# Patient Record
Sex: MALE | Race: WHITE | Employment: OTHER | URBAN - METROPOLITAN AREA
[De-identification: names, ages, dates, MRNs, and addresses within clinical notes are randomized per-mention and may not be internally consistent; named-entity substitution may affect disease eponyms.]

---

## 2017-04-24 ENCOUNTER — HOSPITAL ENCOUNTER (OUTPATIENT)
Dept: INTERVENTIONAL RADIOLOGY/VASCULAR | Age: 82
Discharge: HOME | End: 2017-04-24
Attending: ORTHOPAEDIC SURGERY | Admitting: ORTHOPAEDIC SURGERY
Payer: MEDICARE

## 2017-04-24 VITALS
HEART RATE: 73 BPM | BODY MASS INDEX: 29.62 KG/M2 | SYSTOLIC BLOOD PRESSURE: 155 MMHG | RESPIRATION RATE: 20 BRPM | HEIGHT: 69 IN | OXYGEN SATURATION: 98 % | DIASTOLIC BLOOD PRESSURE: 58 MMHG | WEIGHT: 200 LBS | TEMPERATURE: 98.1 F

## 2017-04-24 DIAGNOSIS — M48.061 LUMBAR STENOSIS: ICD-10-CM

## 2017-04-24 DIAGNOSIS — M51.36 DDD (DEGENERATIVE DISC DISEASE), LUMBAR: ICD-10-CM

## 2017-04-24 PROCEDURE — 64483 NJX AA&/STRD TFRM EPI L/S 1: CPT

## 2017-04-24 PROCEDURE — 62323 NJX INTERLAMINAR LMBR/SAC: CPT

## 2017-04-24 PROCEDURE — 74011000250 HC RX REV CODE- 250: Performed by: RADIOLOGY

## 2017-04-24 PROCEDURE — 74011636320 HC RX REV CODE- 636/320: Performed by: RADIOLOGY

## 2017-04-24 PROCEDURE — 74011250636 HC RX REV CODE- 250/636: Performed by: RADIOLOGY

## 2017-04-24 PROCEDURE — 77030003666 HC NDL SPINAL BD -A

## 2017-04-24 RX ORDER — LIDOCAINE HYDROCHLORIDE 10 MG/ML
15 INJECTION, SOLUTION EPIDURAL; INFILTRATION; INTRACAUDAL; PERINEURAL
Status: COMPLETED | OUTPATIENT
Start: 2017-04-24 | End: 2017-04-24

## 2017-04-24 RX ORDER — CHOLECALCIFEROL (VITAMIN D3) 125 MCG
2 CAPSULE ORAL 2 TIMES DAILY
COMMUNITY
End: 2017-07-27

## 2017-04-24 RX ORDER — METHYLPREDNISOLONE ACETATE 40 MG/ML
120 INJECTION, SUSPENSION INTRA-ARTICULAR; INTRALESIONAL; INTRAMUSCULAR; SOFT TISSUE
Status: COMPLETED | OUTPATIENT
Start: 2017-04-24 | End: 2017-04-24

## 2017-04-24 RX ADMIN — METHYLPREDNISOLONE ACETATE 120 MG: 40 INJECTION, SUSPENSION INTRA-ARTICULAR; INTRALESIONAL; INTRAMUSCULAR; SOFT TISSUE at 10:28

## 2017-04-24 RX ADMIN — LIDOCAINE HYDROCHLORIDE 15 ML: 10 INJECTION, SOLUTION EPIDURAL; INFILTRATION; INTRACAUDAL; PERINEURAL at 10:27

## 2017-04-24 RX ADMIN — IOHEXOL 20 ML: 180 INJECTION INTRAVENOUS at 10:27

## 2017-04-24 NOTE — IP AVS SNAPSHOT
Current Discharge Medication List  
  
ASK your doctor about these medications Dose & Instructions Dispensing Information Comments Morning Noon Evening Bedtime ALEVE 220 mg Cap Generic drug:  naproxen sodium Your last dose was: Your next dose is:    
   
   
 Dose:  2 Tab Take 2 Tabs by mouth two (2) times a day. Refills:  0  
     
   
   
   
  
 aspirin delayed-release 81 mg tablet Your last dose was: Your next dose is:    
   
   
 Dose:  81 mg Take 81 mg by mouth daily. Refills:  0  
     
   
   
   
  
 coenzyme Q-10 200 mg capsule Commonly known as:  CO Q-10 Your last dose was: Your next dose is:    
   
   
 Dose:  200 mg Take 200 mg by mouth daily. Refills:  0  
     
   
   
   
  
 glipiZIDE 5 mg tablet Commonly known as:  Luana López Your last dose was: Your next dose is:    
   
   
 Dose:  5 mg Take 5 mg by mouth every morning. Refills:  0  
     
   
   
   
  
 lisinopril 20 mg tablet Commonly known as:  Mark Busch Your last dose was: Your next dose is:    
   
   
 Dose:  20 mg Take 20 mg by mouth daily. Refills:  0  
     
   
   
   
  
 magnesium 250 mg Tab Your last dose was: Your next dose is:    
   
   
 Dose:  250 mg Take 250 mg by mouth daily. Refills:  0  
     
   
   
   
  
 multivitamin tablet Commonly known as:  ONE A DAY Your last dose was: Your next dose is:    
   
   
 Dose:  1 Tab Take 1 Tab by mouth daily. Refills:  0  
     
   
   
   
  
 oxyCODONE IR 5 mg immediate release tablet Commonly known as:  Clista Mate Your last dose was: Your next dose is:    
   
   
 Dose:  5 mg Take 1 Tab by mouth every four (4) hours as needed for Pain. Max Daily Amount: 30 mg.  
 Quantity:  40 Tab Refills:  0  
     
   
   
   
  
 red yeast rice extract 600 mg Cap Your last dose was: Your next dose is:    
   
   
 Dose:  1200 mg Take 1,200 mg by mouth daily. Refills:  0

## 2017-04-24 NOTE — ROUTINE PROCESS
Pt. Discharged to home. Able to stand and step without difficulty. Pt. Verbalized understanding of d/c instructions.

## 2017-04-24 NOTE — IP AVS SNAPSHOT
[address] 
[phone] Patient: Deshawn June MRN: [id number] TBM:[birth year]15 You are allergic to the following Allergen Reactions Prednisone Drowsiness Recent Documentation Height Weight BMI Smoking Status 1.753 m 90.7 kg 29.53 kg/m2 Former Smoker Emergency Contacts Name Discharge Info Relation Home Work Mobile Karena Mckenzie  Daughter [21] [phone] About your hospitalization You were admitted on:  April 24, 2017 You last received care in the:  [facility] You were discharged on:  April 24, 2017 Unit phone number:  [phone] Why you were hospitalized Your primary diagnosis was:  Not on File Providers Seen During Your Hospitalizations Provider Role Specialty Primary office phone Jose Maria Vargas MD Attending Provider Orthopedic Surgery [phone] Your Primary Care Physician (PCP) Primary Care Physician Office Phone Office Fax Araseli Medrano [address] Follow-up Information None Your Appointments Monday April 24, 2017 10:30 AM EDT  
IR INJ FORA EP LUM ANE/EDVIN with Fabiola Goff MD, [facility]  
[facility] ([address]) [address]  
[phone] GENERAL INSTRUCTIONS Patient should report to the Admitting Office just inside the main entrance to the Hospital 30 minutes prior to the appointment time unless instructed otherwise. (NOT FOR MRI). Current Discharge Medication List  
  
ASK your doctor about these medications Dose & Instructions Dispensing Information Comments Morning Noon Evening Bedtime ALEVE 220 mg Cap Generic drug:  naproxen sodium Your last dose was: Your next dose is:    
   
   
 Dose:  2 Tab Take 2 Tabs by mouth two (2) times a day. Refills:  0  
     
   
   
   
  
 aspirin delayed-release 81 mg tablet Your last dose was: Your next dose is:    
   
   
 Dose:  81 mg Take 81 mg by mouth daily. Refills:  0  
     
   
   
   
  
 coenzyme Q-10 200 mg capsule Commonly known as:  CO Q-10 Your last dose was: Your next dose is:    
   
   
 Dose:  200 mg Take 200 mg by mouth daily. Refills:  0  
     
   
   
   
  
 glipiZIDE 5 mg tablet Commonly known as:  Annetta Rucker Your last dose was: Your next dose is:    
   
   
 Dose:  5 mg Take 5 mg by mouth every morning. Refills:  0  
     
   
   
   
  
 lisinopril 20 mg tablet Commonly known as:  Nancideshaun López Your last dose was: Your next dose is:    
   
   
 Dose:  20 mg Take 20 mg by mouth daily. Refills:  0  
     
   
   
   
  
 magnesium 250 mg Tab Your last dose was: Your next dose is:    
   
   
 Dose:  250 mg Take 250 mg by mouth daily. Refills:  0  
     
   
   
   
  
 multivitamin tablet Commonly known as:  ONE A DAY Your last dose was: Your next dose is:    
   
   
 Dose:  1 Tab Take 1 Tab by mouth daily. Refills:  0  
     
   
   
   
  
 oxyCODONE IR 5 mg immediate release tablet Commonly known as:  Lex Vallejo Your last dose was: Your next dose is:    
   
   
 Dose:  5 mg Take 1 Tab by mouth every four (4) hours as needed for Pain. Max Daily Amount: 30 mg.  
 Quantity:  40 Tab Refills:  0  
     
   
   
   
  
 red yeast rice extract 600 mg Cap Your last dose was: Your next dose is:    
   
   
 Dose:  1200 mg Take 1,200 mg by mouth daily. Refills:  0 Discharge Instructions Steroid Injection Discharge Instructions General Information:  A steroid injection was performed today, placing a combination of a steroid and an anesthetic (numbing medicine) into the space around the nerves of your spine. This is done to treat back pain. It may take 7-10 days for the injection to reach its full potential.  This procedure can be done at any level of the spinal column, depending on where your pain is. Your doctor will have ordered the appropriate level to be treated prior to your coming in for the procedure. Home Care Instructions: You can resume your regular diet. Do not drink alcohol today. You may notice that you have to use your pain medications less after your injection. Some people do not notice much of a change in their pain after the first injection. If that is the case, it is worth your time to have a second one done. This is why these injections are sometimes ordered in a series of three. Keep the puncture site clean and dry for 24 hours, and then you may remove the dressing. Showering is acceptable after the bandage is removed. Rest today be aware there maybe numbness or tingling that may last up to 12 hours after the inject. Call If: 
 You should call your Physician and/or the Radiology Nurse if you have any bleeding other than a small spot on your bandage. Call if you have any signs of infection, fever, increased pain at the puncture site, confusion, or a headache that worsens when you stand and eases when lying flat. Follow-Up Instructions:  Please see your ordering doctor as he/she has requested. Let your doctor know if you have relief from your pain so they may schedule another injection for you if it is indicated. To Reach Us:  Should you experience any significant changes, please call [phone] between the hours of 7:30 am and 10 pm or [phone] after hours. After hours, ask the  to page the [facility] Technologist, and describe the problem to the technologist.  
 
 
 
 
Date: 4/24/2017 Discharging Nurse:  Venita Rodriguez RN Discharge Orders None Introducing [facility] HEALTH SERVICES! [facility] introduces [employer] patient portal. Now you can access parts of your medical record, email your doctor's office, and request medication refills online. 1. In your internet browser, go to [email or web address] 2. Click on the First Time User? Click Here link in the Sign In box. You will see the New Member Sign Up page. 3. Enter your [employer] Access Code exactly as it appears below. You will not need to use this code after youve completed the sign-up process. If you do not sign up before the expiration date, you must request a new code. · [employer] Access Code: [email or web address] Expires: 6/26/2017 11:42 AM 
 
4. Enter the last four digits of your Social Security Number (xxxx) and Date of Birth (mm/dd/yyyy) as indicated and click Submit. You will be taken to the next sign-up page. 5. Create a [employer] ID. This will be your [employer] login ID and cannot be changed, so think of one that is secure and easy to remember. 6. Create a [employer] password. You can change your password at any time. 7. Enter your Password Reset Question and Answer. This can be used at a later time if you forget your password. 8. Enter your e-mail address. You will receive e-mail notification when new information is available in [address] 9. Click Sign Up. You can now view and download portions of your medical record. 10. Click the Download Summary menu link to download a portable copy of your medical information. If you have questions, please visit the Frequently Asked Questions section of the [employer] website. Remember, [employer] is NOT to be used for urgent needs. For medical emergencies, dial 911. Now available from your iPhone and Android! General Information Please provide this summary of care documentation to your next provider. Patient Signature:  ____________________________________________________________ Date:  ____________________________________________________________  
  
Albin Darlin Provider Signature:  ____________________________________________________________ Date:  ____________________________________________________________

## 2017-04-24 NOTE — PROCEDURES
PROCEDURE:EDWIN. INDICATION:spinal stenosis. ANESTHESIA:local.  COMPLICATION:NONE. SPECIMENS REMOVED:none. BLOOD LOSS:NONE. /ASSISTANT:KALA Obrien RECOMMENDATIONS:none. CONSENT OBTAINED:YES.  NOTES:none.

## 2017-04-24 NOTE — DISCHARGE INSTRUCTIONS
Steroid Injection Discharge Instructions    General Information:   A steroid injection was performed today, placing a combination of a steroid and an anesthetic (numbing medicine) into the space around the nerves of your spine. This is done to treat back pain. It may take 7-10 days for the injection to reach its full potential.  This procedure can be done at any level of the spinal column, depending on where your pain is. Your doctor will have ordered the appropriate level to be treated prior to your coming in for the procedure. Home Care Instructions: You can resume your regular diet. Do not drink alcohol today. You may notice that you have to use your pain medications less after your injection. Some people do not notice much of a change in their pain after the first injection. If that is the case, it is worth your time to have a second one done. This is why these injections are sometimes ordered in a series of three. Keep the puncture site clean and dry for 24 hours, and then you may remove the dressing. Showering is acceptable after the bandage is removed. Rest today be aware there maybe numbness or tingling that may last up to 12 hours after the inject. Call If:   You should call your Physician and/or the Radiology Nurse if you have any bleeding other than a small spot on your bandage. Call if you have any signs of infection, fever, increased pain at the puncture site, confusion, or a headache that worsens when you stand and eases when lying flat. Follow-Up Instructions:  Please see your ordering doctor as he/she has requested. Let your doctor know if you have relief from your pain so they may schedule another injection for you if it is indicated. To Reach Us:  Should you experience any significant changes, please call [phone] between the hours of 7:30 am and 10 pm or [phone] after hours.  After hours, ask the  to page the X-ray Technologist, and describe the problem to the technologist.           Date: 4/24/2017  Discharging Nurse:  Venita Rodriguez RN

## 2017-06-28 ENCOUNTER — HOSPITAL ENCOUNTER (OUTPATIENT)
Dept: INTERVENTIONAL RADIOLOGY/VASCULAR | Age: 82
Discharge: HOME | End: 2017-06-28
Attending: ORTHOPAEDIC SURGERY | Admitting: ORTHOPAEDIC SURGERY
Payer: MEDICARE

## 2017-06-28 VITALS
TEMPERATURE: 98.3 F | DIASTOLIC BLOOD PRESSURE: 56 MMHG | RESPIRATION RATE: 20 BRPM | SYSTOLIC BLOOD PRESSURE: 124 MMHG | OXYGEN SATURATION: 98 % | HEART RATE: 80 BPM | WEIGHT: 190 LBS | BODY MASS INDEX: 27.2 KG/M2 | HEIGHT: 70 IN

## 2017-06-28 DIAGNOSIS — M48.061 LUMBAR SPINAL STENOSIS: ICD-10-CM

## 2017-06-28 PROCEDURE — 64483 NJX AA&/STRD TFRM EPI L/S 1: CPT

## 2017-06-28 PROCEDURE — 74011636320 HC RX REV CODE- 636/320: Performed by: RADIOLOGY

## 2017-06-28 PROCEDURE — 77030003666 HC NDL SPINAL BD -A

## 2017-06-28 PROCEDURE — 74011000250 HC RX REV CODE- 250: Performed by: RADIOLOGY

## 2017-06-28 PROCEDURE — 74011250636 HC RX REV CODE- 250/636: Performed by: RADIOLOGY

## 2017-06-28 RX ORDER — LIDOCAINE HYDROCHLORIDE 10 MG/ML
15 INJECTION, SOLUTION EPIDURAL; INFILTRATION; INTRACAUDAL; PERINEURAL
Status: COMPLETED | OUTPATIENT
Start: 2017-06-28 | End: 2017-06-28

## 2017-06-28 RX ORDER — METHYLPREDNISOLONE ACETATE 40 MG/ML
120 INJECTION, SUSPENSION INTRA-ARTICULAR; INTRALESIONAL; INTRAMUSCULAR; SOFT TISSUE
Status: COMPLETED | OUTPATIENT
Start: 2017-06-28 | End: 2017-06-28

## 2017-06-28 RX ADMIN — IOHEXOL 5 ML: 180 INJECTION INTRAVENOUS at 11:25

## 2017-06-28 RX ADMIN — LIDOCAINE HYDROCHLORIDE 15 ML: 10 INJECTION, SOLUTION EPIDURAL; INFILTRATION; INTRACAUDAL; PERINEURAL at 11:24

## 2017-06-28 RX ADMIN — METHYLPREDNISOLONE ACETATE 120 MG: 40 INJECTION, SUSPENSION INTRA-ARTICULAR; INTRALESIONAL; INTRAMUSCULAR; SOFT TISSUE at 11:25

## 2017-06-28 NOTE — DISCHARGE INSTRUCTIONS
Steroid Injection Discharge Instructions    General Information:   A steroid injection was performed today, placing a combination of a steroid and an anesthetic (numbing medicine) into the space around the nerves of your spine. This is done to treat back pain. It may take 7-10 days for the injection to reach its full potential.  This procedure can be done at any level of the spinal column, depending on where your pain is. Your doctor will have ordered the appropriate level to be treated prior to your coming in for the procedure. Home Care Instructions: You can resume your regular diet. Do not drink alcohol. You may notice that you have to use your pain medications less after your injection. Some people do not notice much of a change in their pain after the first injection. If that is the case, it is worth your time to have a second one done. This is why these injections are sometimes ordered in a series of three. Keep the puncture site clean and dry for 24 hours, and then you may remove the dressing. Showering is acceptable after the bandage is removed. Call If:   You should call your Physician and/or the Radiology Nurse if you have any bleeding other than a small spot on your bandage. Call if you have any signs of infection, fever, increased pain at the puncture site, confusion, or a headache that worsens when you stand and eases when lying flat. Follow-Up Instructions:  Please see your ordering doctor as he/she has requested. Let your doctor know if you have relief from your pain so they may schedule another injection for you if it is indicated. To Reach Us:  Side effects of sedation medications and other medications used today have been reviewed. Notify us of nausea, itching, hives, dizziness, or anything else out of the ordinary. Should you experience any of these significant changes, please call [phone] between the hours of 7:30 am and 10 pm or [phone] after hours.  After hours, ask the  to page the [address] Technologist, and describe the problem to the technologist.         Patient Signature:  Date: 6/28/2017  Discharging Nurse: Toro Martinez RN

## 2017-06-28 NOTE — ROUTINE PROCESS
Pt discharged to home with belongings and instructions via wheelchair with daughter. Pt at baseline walking with cane. Back site dry and intact. Pt verbalized understanding of instructions.

## 2017-06-29 NOTE — PROCEDURES
PROCEDURE:TFESI. INDICATION:LBP. ANESTHESIA:local  COMPLICATION:NONE. SPECIMENS REMOVED:none. BLOOD LOSS:NONE. /ASSISTANT:KALA Obrien RECOMMENDATIONS:none. CONSENT OBTAINED:YES.  NOTES:none.

## 2017-07-27 ENCOUNTER — APPOINTMENT (OUTPATIENT)
Dept: MRI IMAGING | Age: 82
DRG: 460 | End: 2017-07-27
Attending: NURSE PRACTITIONER
Payer: MEDICARE

## 2017-07-27 ENCOUNTER — HOSPITAL ENCOUNTER (INPATIENT)
Age: 82
LOS: 8 days | Discharge: NURSING, REHAB OR LONG-TERM CARE | DRG: 460 | End: 2017-08-04
Attending: EMERGENCY MEDICINE | Admitting: ORTHOPAEDIC SURGERY
Payer: MEDICARE

## 2017-07-27 DIAGNOSIS — M48.00 SPINAL STENOSIS, UNSPECIFIED SPINAL REGION: ICD-10-CM

## 2017-07-27 DIAGNOSIS — R52 INTRACTABLE PAIN: Primary | ICD-10-CM

## 2017-07-27 LAB
ALBUMIN SERPL BCP-MCNC: 3.7 G/DL (ref 3.5–5)
ALBUMIN/GLOB SERPL: 1.2 {RATIO} (ref 1.1–2.2)
ALP SERPL-CCNC: 65 U/L (ref 45–117)
ALT SERPL-CCNC: 24 U/L (ref 12–78)
ANION GAP BLD CALC-SCNC: 6 MMOL/L (ref 5–15)
AST SERPL W P-5'-P-CCNC: 19 U/L (ref 15–37)
BASOPHILS # BLD AUTO: 0 K/UL (ref 0–0.1)
BASOPHILS # BLD: 0 % (ref 0–1)
BILIRUB SERPL-MCNC: 0.4 MG/DL (ref 0.2–1)
BUN SERPL-MCNC: 35 MG/DL (ref 6–20)
BUN/CREAT SERPL: 26 (ref 12–20)
CALCIUM SERPL-MCNC: 8.5 MG/DL (ref 8.5–10.1)
CHLORIDE SERPL-SCNC: 106 MMOL/L (ref 97–108)
CO2 SERPL-SCNC: 28 MMOL/L (ref 21–32)
CREAT SERPL-MCNC: 1.37 MG/DL (ref 0.7–1.3)
EOSINOPHIL # BLD: 0.1 K/UL (ref 0–0.4)
EOSINOPHIL NFR BLD: 2 % (ref 0–7)
ERYTHROCYTE [DISTWIDTH] IN BLOOD BY AUTOMATED COUNT: 12.4 % (ref 11.5–14.5)
GLOBULIN SER CALC-MCNC: 3.1 G/DL (ref 2–4)
GLUCOSE BLD STRIP.AUTO-MCNC: 162 MG/DL (ref 65–100)
GLUCOSE SERPL-MCNC: 97 MG/DL (ref 65–100)
HCT VFR BLD AUTO: 35.5 % (ref 36.6–50.3)
HGB BLD-MCNC: 11.7 G/DL (ref 12.1–17)
LYMPHOCYTES # BLD AUTO: 24 % (ref 12–49)
LYMPHOCYTES # BLD: 1.7 K/UL (ref 0.8–3.5)
MCH RBC QN AUTO: 31.3 PG (ref 26–34)
MCHC RBC AUTO-ENTMCNC: 33 G/DL (ref 30–36.5)
MCV RBC AUTO: 94.9 FL (ref 80–99)
MONOCYTES # BLD: 0.4 K/UL (ref 0–1)
MONOCYTES NFR BLD AUTO: 6 % (ref 5–13)
NEUTS SEG # BLD: 4.8 K/UL (ref 1.8–8)
NEUTS SEG NFR BLD AUTO: 68 % (ref 32–75)
PLATELET # BLD AUTO: 199 K/UL (ref 150–400)
POTASSIUM SERPL-SCNC: 4.8 MMOL/L (ref 3.5–5.1)
PROT SERPL-MCNC: 6.8 G/DL (ref 6.4–8.2)
RBC # BLD AUTO: 3.74 M/UL (ref 4.1–5.7)
SERVICE CMNT-IMP: ABNORMAL
SODIUM SERPL-SCNC: 140 MMOL/L (ref 136–145)
WBC # BLD AUTO: 7 K/UL (ref 4.1–11.1)

## 2017-07-27 PROCEDURE — 83036 HEMOGLOBIN GLYCOSYLATED A1C: CPT | Performed by: NURSE PRACTITIONER

## 2017-07-27 PROCEDURE — 96376 TX/PRO/DX INJ SAME DRUG ADON: CPT

## 2017-07-27 PROCEDURE — 82962 GLUCOSE BLOOD TEST: CPT

## 2017-07-27 PROCEDURE — 72148 MRI LUMBAR SPINE W/O DYE: CPT

## 2017-07-27 PROCEDURE — 80053 COMPREHEN METABOLIC PANEL: CPT | Performed by: NURSE PRACTITIONER

## 2017-07-27 PROCEDURE — 96375 TX/PRO/DX INJ NEW DRUG ADDON: CPT

## 2017-07-27 PROCEDURE — 72146 MRI CHEST SPINE W/O DYE: CPT

## 2017-07-27 PROCEDURE — 74011250637 HC RX REV CODE- 250/637: Performed by: PHYSICIAN ASSISTANT

## 2017-07-27 PROCEDURE — 74011250637 HC RX REV CODE- 250/637: Performed by: FAMILY MEDICINE

## 2017-07-27 PROCEDURE — 65270000029 HC RM PRIVATE

## 2017-07-27 PROCEDURE — 99285 EMERGENCY DEPT VISIT HI MDM: CPT

## 2017-07-27 PROCEDURE — 36415 COLL VENOUS BLD VENIPUNCTURE: CPT | Performed by: NURSE PRACTITIONER

## 2017-07-27 PROCEDURE — 74011250636 HC RX REV CODE- 250/636: Performed by: NURSE PRACTITIONER

## 2017-07-27 PROCEDURE — 74011250636 HC RX REV CODE- 250/636: Performed by: PHYSICIAN ASSISTANT

## 2017-07-27 PROCEDURE — 85025 COMPLETE CBC W/AUTO DIFF WBC: CPT | Performed by: NURSE PRACTITIONER

## 2017-07-27 PROCEDURE — 96374 THER/PROPH/DIAG INJ IV PUSH: CPT

## 2017-07-27 PROCEDURE — 72141 MRI NECK SPINE W/O DYE: CPT

## 2017-07-27 RX ORDER — ASPIRIN 81 MG/1
81 TABLET ORAL DAILY
Status: DISCONTINUED | OUTPATIENT
Start: 2017-07-28 | End: 2017-07-28

## 2017-07-27 RX ORDER — LISINOPRIL 20 MG/1
20 TABLET ORAL DAILY
Status: DISCONTINUED | OUTPATIENT
Start: 2017-07-28 | End: 2017-07-29

## 2017-07-27 RX ORDER — INSULIN LISPRO 100 [IU]/ML
INJECTION, SOLUTION INTRAVENOUS; SUBCUTANEOUS
Status: DISCONTINUED | OUTPATIENT
Start: 2017-07-27 | End: 2017-08-04 | Stop reason: HOSPADM

## 2017-07-27 RX ORDER — DIPHENHYDRAMINE HYDROCHLORIDE 50 MG/ML
12.5 INJECTION, SOLUTION INTRAMUSCULAR; INTRAVENOUS
Status: DISCONTINUED | OUTPATIENT
Start: 2017-07-27 | End: 2017-08-01

## 2017-07-27 RX ORDER — DEXAMETHASONE SODIUM PHOSPHATE 4 MG/ML
10 INJECTION, SOLUTION INTRA-ARTICULAR; INTRALESIONAL; INTRAMUSCULAR; INTRAVENOUS; SOFT TISSUE ONCE
Status: COMPLETED | OUTPATIENT
Start: 2017-07-27 | End: 2017-07-27

## 2017-07-27 RX ORDER — ONDANSETRON 2 MG/ML
4 INJECTION INTRAMUSCULAR; INTRAVENOUS
Status: COMPLETED | OUTPATIENT
Start: 2017-07-27 | End: 2017-07-27

## 2017-07-27 RX ORDER — HYDROCODONE BITARTRATE AND ACETAMINOPHEN 5; 325 MG/1; MG/1
1 TABLET ORAL
COMMUNITY
Start: 2017-07-26 | End: 2017-08-04

## 2017-07-27 RX ORDER — OXYCODONE AND ACETAMINOPHEN 5; 325 MG/1; MG/1
1 TABLET ORAL
Status: DISCONTINUED | OUTPATIENT
Start: 2017-07-27 | End: 2017-08-01

## 2017-07-27 RX ORDER — LISINOPRIL 20 MG/1
20 TABLET ORAL DAILY
COMMUNITY

## 2017-07-27 RX ORDER — GLIPIZIDE 5 MG/1
5 TABLET, FILM COATED, EXTENDED RELEASE ORAL DAILY
COMMUNITY

## 2017-07-27 RX ORDER — SODIUM CHLORIDE 0.9 % (FLUSH) 0.9 %
5-10 SYRINGE (ML) INJECTION EVERY 8 HOURS
Status: DISCONTINUED | OUTPATIENT
Start: 2017-07-27 | End: 2017-08-04 | Stop reason: HOSPADM

## 2017-07-27 RX ORDER — FENTANYL CITRATE 50 UG/ML
50 INJECTION, SOLUTION INTRAMUSCULAR; INTRAVENOUS
Status: COMPLETED | OUTPATIENT
Start: 2017-07-27 | End: 2017-07-27

## 2017-07-27 RX ORDER — DEXTROSE 50 % IN WATER (D50W) INTRAVENOUS SYRINGE
12.5-25 AS NEEDED
Status: DISCONTINUED | OUTPATIENT
Start: 2017-07-27 | End: 2017-07-27 | Stop reason: CLARIF

## 2017-07-27 RX ORDER — HYDROMORPHONE HYDROCHLORIDE 2 MG/ML
1 INJECTION, SOLUTION INTRAMUSCULAR; INTRAVENOUS; SUBCUTANEOUS ONCE
Status: COMPLETED | OUTPATIENT
Start: 2017-07-27 | End: 2017-07-27

## 2017-07-27 RX ORDER — ACETAMINOPHEN 325 MG/1
650 TABLET ORAL
Status: DISCONTINUED | OUTPATIENT
Start: 2017-07-27 | End: 2017-08-01

## 2017-07-27 RX ORDER — FACIAL-BODY WIPES
10 EACH TOPICAL DAILY PRN
Status: DISCONTINUED | OUTPATIENT
Start: 2017-07-27 | End: 2017-08-01

## 2017-07-27 RX ORDER — GLIPIZIDE 5 MG/1
5 TABLET, FILM COATED, EXTENDED RELEASE ORAL
Status: DISCONTINUED | OUTPATIENT
Start: 2017-07-28 | End: 2017-07-29

## 2017-07-27 RX ORDER — SODIUM CHLORIDE 0.9 % (FLUSH) 0.9 %
5-10 SYRINGE (ML) INJECTION AS NEEDED
Status: DISCONTINUED | OUTPATIENT
Start: 2017-07-27 | End: 2017-08-04 | Stop reason: HOSPADM

## 2017-07-27 RX ORDER — MAGNESIUM SULFATE 100 %
4 CRYSTALS MISCELLANEOUS AS NEEDED
Status: DISCONTINUED | OUTPATIENT
Start: 2017-07-27 | End: 2017-08-04 | Stop reason: HOSPADM

## 2017-07-27 RX ORDER — THERA TABS 400 MCG
1 TAB ORAL DAILY
Status: DISCONTINUED | OUTPATIENT
Start: 2017-07-28 | End: 2017-08-04 | Stop reason: HOSPADM

## 2017-07-27 RX ORDER — ONDANSETRON 2 MG/ML
4 INJECTION INTRAMUSCULAR; INTRAVENOUS
Status: DISCONTINUED | OUTPATIENT
Start: 2017-07-27 | End: 2017-08-01

## 2017-07-27 RX ORDER — METOPROLOL TARTRATE 25 MG/1
12.5 TABLET, FILM COATED ORAL EVERY 12 HOURS
Status: DISCONTINUED | OUTPATIENT
Start: 2017-07-27 | End: 2017-08-04 | Stop reason: HOSPADM

## 2017-07-27 RX ORDER — MORPHINE SULFATE 2 MG/ML
4 INJECTION, SOLUTION INTRAMUSCULAR; INTRAVENOUS ONCE
Status: COMPLETED | OUTPATIENT
Start: 2017-07-27 | End: 2017-07-27

## 2017-07-27 RX ORDER — TRAMADOL HYDROCHLORIDE 50 MG/1
50 TABLET ORAL
COMMUNITY
End: 2017-08-04

## 2017-07-27 RX ADMIN — ONDANSETRON 4 MG: 2 INJECTION INTRAMUSCULAR; INTRAVENOUS at 18:49

## 2017-07-27 RX ADMIN — Medication 10 ML: at 23:19

## 2017-07-27 RX ADMIN — ONDANSETRON 4 MG: 2 INJECTION INTRAMUSCULAR; INTRAVENOUS at 12:12

## 2017-07-27 RX ADMIN — OXYCODONE HYDROCHLORIDE AND ACETAMINOPHEN 1 TABLET: 5; 325 TABLET ORAL at 18:49

## 2017-07-27 RX ADMIN — OXYCODONE HYDROCHLORIDE AND ACETAMINOPHEN 1 TABLET: 5; 325 TABLET ORAL at 23:16

## 2017-07-27 RX ADMIN — METOPROLOL TARTRATE 12.5 MG: 25 TABLET ORAL at 23:16

## 2017-07-27 RX ADMIN — HYDROMORPHONE HYDROCHLORIDE 1 MG: 2 INJECTION, SOLUTION INTRAMUSCULAR; INTRAVENOUS; SUBCUTANEOUS at 16:26

## 2017-07-27 RX ADMIN — MORPHINE SULFATE 4 MG: 2 INJECTION, SOLUTION INTRAMUSCULAR; INTRAVENOUS at 12:12

## 2017-07-27 RX ADMIN — FENTANYL CITRATE 50 MCG: 50 INJECTION, SOLUTION INTRAMUSCULAR; INTRAVENOUS at 13:47

## 2017-07-27 RX ADMIN — DEXAMETHASONE SODIUM PHOSPHATE 10 MG: 4 INJECTION INTRA-ARTICULAR; INTRALESIONAL; INTRAMUSCULAR; INTRAVENOUS; SOFT TISSUE at 17:09

## 2017-07-27 RX ADMIN — FENTANYL CITRATE 50 MCG: 50 INJECTION, SOLUTION INTRAMUSCULAR; INTRAVENOUS at 12:45

## 2017-07-27 NOTE — ED PROVIDER NOTES
HPI Comments: This is an [de-identified] y/o male who presents to the ED with a cc of back pain. Patient has recently seen Dr. Arnie Randolph for back pain, he was noted to have foot drop and had MRI scheduled for today as well as an appointment later today. Today he was at home and was ambulating with his walker when he had onset of back in his lower back. There was no fall. The pain is significant and radiates down the legs. He denies bowel or bladder dysfunction, saddle anesthesia, fever, CP, SOB, abd pain. He arrives via EMS and received fentanyl en route. Rates current pain as 10/10 severe aching, stabbing pain. It is much worse with movement.     pmhx DDD, spinal stenosis, diabetes, HTN, arthritis  surghx aortic valve replacement, appendectomy, carpal tunnel release, total knee arthroplasty, shoulder surgery  sochx current some day smoker, no drug use    Patient is a [de-identified] y.o. male presenting with back pain. Back Pain    Pertinent negatives include no chest pain and no fever. Past Medical History:   Diagnosis Date    Arthritis     Osteo    Cancer [facility])     states precancer skin    DDD (degenerative disc disease), lumbar     Diabetes ([facility] [address]     TYPE II    Hypertension     controlled by meds.     Lumbago     Lumbar spinal stenosis     Other ill-defined conditions     S/P aortic valve replacement with porcine valve        Past Surgical History:   Procedure Laterality Date    CARDIAC SURG PROCEDURE UNLIST  2008    aortic valve replacement    HX APPENDECTOMY  1953    HX GI      colonoscopy x 2    HX ORTHOPAEDIC Bilateral     CARPAL TUNNEL    HX SHOULDER REPLACEMENT Right 2014    NEUROLOGICAL PROCEDURE UNLISTED      Dexter carpal tunnel release    TOTAL KNEE ARTHROPLASTY Right 1/2013         Family History:   Problem Relation Age of Onset    Diabetes Mother     Lung Disease Father     Suicide Brother     Diabetes Brother     Anesth Problems Neg Hx        Social History     Social History    Marital status:      Spouse name: N/A    Number of children: N/A    Years of education: N/A     Occupational History    Not on file. Social History Main Topics    Smoking status: Current Some Day Smoker    Smokeless tobacco: Current User      Comment: SMOKES CIGAR OCC.  Alcohol use 2.0 oz/week     4 Cans of beer per week    Drug use: No    Sexual activity: Not on file     Other Topics Concern    Not on file     Social History Narrative         ALLERGIES: Prednisone    Review of Systems   Constitutional: Negative for fever. Respiratory: Negative for shortness of breath. Cardiovascular: Negative for chest pain. Gastrointestinal: Negative for vomiting. Genitourinary: Negative for difficulty urinating. Musculoskeletal: Positive for back pain. Allergic/Immunologic: Negative for immunocompromised state. All other systems reviewed and are negative. Vitals:    07/27/17 1200 07/27/17 1330 07/27/17 1400 07/27/17 1612   BP: 153/68 124/68 138/73 121/71   Pulse:  83 77 79   Resp:  20 15 12   Temp:    98.4 °F (36.9 °C)   SpO2: 95% 98% 97% 99%   Height:                Physical Exam   Constitutional: He is oriented to person, place, and time. He appears well-developed and well-nourished. He appears distressed. Nontoxic, appears uncomfortable particularly with movement   HENT:   Head: Normocephalic and atraumatic. Eyes: Conjunctivae are normal. Right eye exhibits no discharge. Left eye exhibits no discharge. Neck: Normal range of motion. Neck supple. Cardiovascular: Normal rate, regular rhythm and normal heart sounds. Exam reveals no gallop and no friction rub. No murmur heard. Pulses:       Dorsalis pedis pulses are 2+ on the right side, and 2+ on the left side. Pulmonary/Chest: Effort normal and breath sounds normal. No respiratory distress. He has no wheezes. He has no rales. Abdominal: Soft. He exhibits no distension. There is no tenderness. There is no rebound and no guarding. Musculoskeletal: Normal range of motion. He exhibits no edema or deformity. ROM of back and lower extremities limited due to significant pain with movement, full LE strength exam limited d/t pain, bilateral foot drop  Extremities warm, dry, well perfused with 2+ dp pulses    Neurological: He is alert and oriented to person, place, and time. He displays no atrophy and no tremor. No sensory deficit. He exhibits normal muscle tone. He displays no seizure activity. GCS eye subscore is 4. GCS verbal subscore is 5. GCS motor subscore is 6. Skin: Skin is warm and dry. He is not diaphoretic. Psychiatric: He has a normal mood and affect. His behavior is normal. Judgment and thought content normal.   Nursing note and vitals reviewed. MDM  Number of Diagnoses or Management Options  Intractable pain:   Spinal stenosis, unspecified spinal region:      Amount and/or Complexity of Data Reviewed  Clinical lab tests: ordered and reviewed  Tests in the radiology section of CPT®: reviewed and ordered  Discuss the patient with other providers: yes (SEN Bates, orthopedic surgery)      ED Course     [de-identified] y/o male with back pain. Pt has required multiple doses of pain medication. I spoke with Rob Rudolph with ortho, Dr. Hu Núñez. She saw patient in the ED and MRIs were ordered. MRI with multifocal level degenerative change with severe canal stenosis at T8-9 with cord edema. Ortho will admit for further eval and tx. Hospitalist consulted for medical management.            Procedures

## 2017-07-27 NOTE — ED TRIAGE NOTES
Arrives via EMS from home for a \"pop\" in his lower back while walking with his walker. Saw Dr. Coty Weaver yesterday for similar complaint, scheduled for MRI of lumbar at 2pm at [facility] imaging center then appt at 430p with Dr. Coty Weaver. Hx Lumbar DDD and lumbar spinal stenosis. Hx sciatica.   Received 100mcg Fentanyl en route

## 2017-07-27 NOTE — ED NOTES
TRANSFER - OUT REPORT:    Verbal report given to Alpa Baron RN (name) on Diana Alicea  being transferred to 5W room  (unit) for routine progression of care       Report consisted of patients Situation, Background, Assessment and   Recommendations(SBAR). Information from the following report(s) SBAR, Kardex, ED Summary, Procedure Summary, MAR, Accordion, Recent Results and Cardiac Rhythm NSR was reviewed with the receiving nurse. Opportunity for questions and clarification was provided.       Patient transported with:   O2 @ 3 liters  Tech

## 2017-07-27 NOTE — CONSULTS
ORTHO CONSULT NOTE    NAME: Suzy Fong   :  1935   MRN:  [id number]   DATE:             2017    CHIEF COMPLAINT: Severe leg and feet pain    SUBJECTIVE: Patient is known to us having been seen in the office with a history of cervical and lumbar stenosis. He was seen by Dr. Oralia Yee 17 with complaint of leg pain and weakness and a triple study MRI was ordered and a same day follow up appointment was scheduled with Dr. Oralia Yee. This morning, patient was ambulating in the house using a walker and he twisted which resulted in sudden onset severe pain down the legs to the feet. The pain was so severe, he was transported via EMS to the [facility] ED for evaluation. He denies loss of bowel/bladder control. He is lying in the bed with the head of the bed elevated and complains of severe pain to the legs and feet with right being worse than the left. No back pain. Family is at the bedside. Lab Results   Component Value Date/Time    HGB 13.0 2015 03:46 PM     No results for input(s): HGB, HCT, INR, NA, K, CL, CO2, BUN, CREA, GLU, HGBEXT, HCTEXT in the last 72 hours. No lab exists for component: INREXT  Patient Vitals for the past 12 hrs:   BP Temp Pulse Resp SpO2 Height   17 1200 153/68 - - - 95 % -   17 1130 147/61 - - - 96 % -   17 1126 147/61 97.9 °F (36.6 °C) 81 16 95 % 5' 10\" (1.778 m)     EXAM:   Quadriceps and hip flexors 5/5 bilaterally  Dorsiflexion, eversion, plantar flexion 0/5 on the right. Dorsiflexion, eversion, plantar flexion 3/5 on the left. PLAN:  Obtain MRI w/o contrast cervical, thoracic and lumbar spine    Lumbar impression: Severe chronic degenerative changes with diffuse spinal stenosis as  above. Thoracic impression: Multifocal level degenerative change with severe canal stenosis at T8-9 with  associated cord edema.  Moderate narrowing of the canal at T10-T11 with associated cord edema  Severe narrowing of the canal L1-L2 with left paracentral disc extrusion  narrowing the left subarticular zone and left lateral recess posterior to L2    Cervical impression: Post surgical and degenerative changes with multilevel spinal and  foraminal stenosis    ED to initiate decadron   Patient to be admitted  Surgery to be scheduled for next week.  Details to follow  Consult hospitalist for medical management and pre-op clearance    Tony Bang PA-C

## 2017-07-27 NOTE — ROUTINE PROCESS
TRANSFER - IN REPORT:    Verbal report received from Chelsea (name) on Dusty Hallmark  being received from ED (unit) for routine progression of care      Report consisted of patients Situation, Background, Assessment and   Recommendations(SBAR). Information from the following report(s) SBAR, Kardex, Intake/Output and MAR was reviewed with the receiving nurse. Opportunity for questions and clarification was provided. Assessment completed upon patients arrival to unit and care assumed. Primary Nurse Stefan Snyder and Bertram Martines RN performed a dual skin assessment on this patient. No impairment noted. Kushal score is 21.

## 2017-07-27 NOTE — IP AVS SNAPSHOT
[address] [facility] [address] 13 
[phone] Patient: Cy Solorzano MRN: [id number] GOI:[id number] You are allergic to the following Allergen Reactions Prednisone Drowsiness Vertigo \"loopy, confusion\" Recent Documentation Height Weight BMI Smoking Status 1.778 m 88.5 kg 27.98 kg/m2 Current Some Day Smoker Emergency Contacts Name Discharge Info Relation Home Work Mobile Mike Marie  Daughter [21] [phone] About your hospitalization You were admitted on:  July 27, 2017 You last received care in the:  [address] [place] [address] You were discharged on:  August 4, 2017 Unit phone number:  [phone] Why you were hospitalized Your primary diagnosis was: Intractable Pain Providers Seen During Your Hospitalizations Provider Role Specialty Primary office phone Cm Zuleta MD Attending Provider Emergency Medicine [phone] Solis Hernandez MD Attending Provider Orthopedic Surgery [phone] Your Primary Care Physician (PCP) Primary Care Physician Office Phone Office Fax [facility] [address] Follow-up Information Follow up With Details Comments Contact Info [address] [facility] [address] 
[phone] Windy Calvin MD   [address] [address] 
[phone] Current Discharge Medication List  
  
START taking these medications Dose & Instructions Dispensing Information Comments Morning Noon Evening Bedtime  
 oxyCODONE IR 5 mg immediate release tablet Commonly known as:  Major Clif Your last dose was: Your next dose is:    
   
   
 Dose:  5-10 mg Take 1-2 Tabs by mouth every four (4) hours as needed. Max Daily Amount: 60 mg.  
 Quantity:  60 Tab Refills:  0 CONTINUE these medications which have NOT CHANGED Dose & Instructions Dispensing Information Comments Morning Noon Evening Bedtime CALCIUM PO Your last dose was: Your next dose is: Take  by mouth daily. Refills:  0  
     
   
   
   
  
 coenzyme Q-10 200 mg capsule Commonly known as:  CO Q-10 Your last dose was: Your next dose is:    
   
   
 Dose:  200 mg Take 200 mg by mouth daily. Refills:  0  
     
   
   
   
  
 glipiZIDE SR 5 mg CR tablet Commonly known as:  GLUCOTROL XL Your last dose was: Your next dose is:    
   
   
 Dose:  5 mg Take 5 mg by mouth daily. Refills:  0  
     
   
   
   
  
 lisinopril 20 mg tablet Commonly known as:  Beatriz Fess Your last dose was: Your next dose is:    
   
   
 Dose:  20 mg Take 20 mg by mouth daily. Refills:  0  
     
   
   
   
  
 magnesium 250 mg Tab Your last dose was: Your next dose is:    
   
   
 Dose:  250 mg Take 250 mg by mouth daily. Refills:  0  
     
   
   
   
  
 multivitamin tablet Commonly known as:  ONE A DAY Your last dose was: Your next dose is:    
   
   
 Dose:  1 Tab Take 1 Tab by mouth daily. Refills:  0  
     
   
   
   
  
 red yeast rice extract 600 mg Cap Your last dose was: Your next dose is:    
   
   
 Dose:  1200 mg Take 1,200 mg by mouth daily. Refills:  0 STOP taking these medications   
 aspirin delayed-release 81 mg tablet HYDROcodone-acetaminophen 5-325 mg per tablet Commonly known as:  NORCO  
   
  
 traMADol 50 mg tablet Commonly known as:  ULTRAM  
   
  
  
  
Where to Get Your Medications Information on where to get these meds will be given to you by the nurse or doctor. ! Ask your nurse or doctor about these medications  
  oxyCODONE IR 5 mg immediate release tablet Discharge Instructions After Hospital Care Plan:  Discharge Instructions Lumbar Fusion Surgery Dr. Letty Tejeda Will DARIEN Mclaughlin PA-C Patient Name Rocío Casillas) Date of procedure (@ORDATE)  Date of discharge: 
Procedure (Procedure(s): 
T8-S1 THORACOLUMBAR  LAMINECTOMY WITH T6-L1 POSTERIOR SPINAL FUSION/ALLOGRAFT ) Surgeon (Surgeon(s) and Role: Nicanor Johnston MD - Primary)   PCP: 
 
Follow up appointments 
-follow up with Dr. Letty Tejeda in 2 weeks. Call [phone], ext [phone] to make an appointment as soon as you get home from the hospital. 
 
[facility] _________________________   phone: _______________________ The agency will contact you to arrange dates/times for visits. Please call them if you do not hear from them within 24 hours after you are discharged When to call your Orthopaedic Surgeon: 
-Signs of infection-if your incision is red; continues to have drainage; drainage has a foul odor or if you have a persistent fever over 101 degrees for 24 hours 
-nausea or vomiting, severe headache 
-loss of bowel or bladder function, inability to urinate 
-changes in sensation in your arms or legs (numbness, tingling, loss of color) 
-increased weakness-greater than before your surgery 
-severe pain or pain not relieved by medications 
-Signs of a blood clot in your leg-calf pain, tenderness, redness, swelling of lower leg When to call your Primary Care Physician: 
-Concerns about medical conditions such as diabetes, high blood pressure, asthma, congestive heart failure 
-Call if blood sugars are elevated, persistent headache or dizziness, coughing or congestion, constipation or diarrhea, burning with urination, abnormal heart rate When to call 911and go to the nearest emergency room 
-acute onset of chest pain, shortness of breath, difficulty breathing Activity - Your only exercise should be walking.   Start with short frequent walks and increase your walking distance each day. 
-Limit the amount of time you sit to 20-30 minute intervals. Sitting for prolonged periods of time will be uncomfortable for you following surgery. 
-Do NOT lift anything over 5 pounds 
-Do NOT do any straining, twisting or bending 
-When you are in bed, you may lay on your back or on either side. Do NOT lie on your stomach Brace  if ordered by surgeon  
-If you have a back brace, you should wear your brace at all times when you are out of bed. Do not wear the brace while in bed or showering. 
-Remember to always wear a cotton t-shirt underneath your brace. 
-Do not bend or twist when your brace is off Driving 
-You may not drive or return to work until instructed by your physician. However, you may ride in the car for short periods of time. Incision Care Your incision has been closed with absorbable sutures and the Dermabond Prineo skin closure system. This is a combination of a mesh and a liquid adhesive that will assist with healing. The mesh is to remain on your incision for 2 weeks. A dry dressing (ABD and tape) will be placed over it and should be changed daily. Please make sure the person performing your dressing changes washes their hands before touching the dressing. You may take brief showers but do not run the water directly onto the wound. After your shower, remove your dressing and blot your incision dry with a clean, soft towel and replace the dry dressing. Do not allow the tape to come in contact with the mesh. Do not rub or apply any lotions or ointments to your incision site. Do not soak or scrub your wound. The mesh dressing will be removed during your two week follow-up appointment. If you experience drainage leaking from underneath the mesh or if it peels off before 2 weeks, please contact your orthopedic surgeons office. Showering 
-You may shower in approximately 4 days after your surgery. -Leave the dressing on during your shower without allowing the water to run over it. 
 
-Reminder- your brace can be removed while showering. Remember to not bend or twist while your brace is off.   
 
-Do not take a tub bath. Preventing blood clots 
-You have been given T.E.D. stockings to wear. Continue to wear these for 7 days after your discharge. Put them on in the morning and take them off at night.   
 
-They are used to increase your circulation and prevent blood clots from forming in your legs Pain management 
-take pain medication as prescribed; decrease the amount you use as your pain lessens 
-avoid alcoholic beverages while taking pain medication 
-avoid NSAIDS (Aleve, Ibuprofen, Aspirin) until your surgeon approves it 
-Please be aware that many medications contain Tylenol. We do not want you to over medicate so please read the information below as a guide. Do not take more than 4 Grams of Tylenol in a 24 hour period. (There are 1000 milligrams in one Gram) Percocet contains 325 mg of Tylenol per tablet (do not take more than 12 tablets in 24 hours) Lortab contains 500 mg of Tylenol per tablet (do not take more than 8 tablets in 24 hours) Norco contains 325 mg of Tylenol per tablet (do not take more than 12 tablets in 24 hours). Diet 
-resume usual diet; drink plenty of fluids; eat foods high in fiber 
-It is important to have regular bowel movements. Pain medications may cause constipation. You may want to take a stool softener (such as Senokot-S or Colace) to prevent constipation. If constipation occurs, take a laxative (such as Dulcolax tablets, Milk of Magnesia, or a suppository). Laxatives should only be used if the above preventable measures have failed and you still have not had a bowel movement after three days Discharge Orders None Introducing [facility]!    
 [employer] introduces [employer] patient portal. Now you can access parts of your medical record, email your doctor's office, and request medication refills online. 1. In your internet browser, go to [email or web address] 2. Click on the First Time User? Click Here link in the Sign In box. You will see the New Member Sign Up page. 3. Enter your [employer] Access Code exactly as it appears below. You will not need to use this code after youve completed the sign-up process. If you do not sign up before the expiration date, you must request a new code. · [employer] Access Code: [email or web address] Expires: 9/26/2017  8:55 AM 
 
4. Enter the last four digits of your Social Security Number (xxxx) and Date of Birth (mm/dd/yyyy) as indicated and click Submit. You will be taken to the next sign-up page. 5. Create a [employer] ID. This will be your [employer] login ID and cannot be changed, so think of one that is secure and easy to remember. 6. Create a [employer] password. You can change your password at any time. 7. Enter your Password Reset Question and Answer. This can be used at a later time if you forget your password. 8. Enter your e-mail address. You will receive e-mail notification when new information is available in [address] 9. Click Sign Up. You can now view and download portions of your medical record. 10. Click the Download Summary menu link to download a portable copy of your medical information. If you have questions, please visit the Frequently Asked Questions section of the [employer] website. Remember, [employer] is NOT to be used for urgent needs. For medical emergencies, dial 911. Now available from your iPhone and Android! General Information Please provide this summary of care documentation to your next provider. Patient Signature:  ____________________________________________________________ Date:  ____________________________________________________________  
  
Edrie Gunnels  Provider Signature: ____________________________________________________________ Date:  ____________________________________________________________

## 2017-07-28 LAB
ATRIAL RATE: 63 BPM
ATRIAL RATE: 63 BPM
CALCULATED P AXIS, ECG09: 20 DEGREES
CALCULATED P AXIS, ECG09: 20 DEGREES
CALCULATED R AXIS, ECG10: 59 DEGREES
CALCULATED R AXIS, ECG10: 59 DEGREES
CALCULATED T AXIS, ECG11: 51 DEGREES
CALCULATED T AXIS, ECG11: 51 DEGREES
DIAGNOSIS, 93000: NORMAL
DIAGNOSIS, 93000: NORMAL
EST. AVERAGE GLUCOSE BLD GHB EST-MCNC: 123 MG/DL
GLUCOSE BLD STRIP.AUTO-MCNC: 115 MG/DL (ref 65–100)
GLUCOSE BLD STRIP.AUTO-MCNC: 116 MG/DL (ref 65–100)
GLUCOSE BLD STRIP.AUTO-MCNC: 119 MG/DL (ref 65–100)
GLUCOSE BLD STRIP.AUTO-MCNC: 147 MG/DL (ref 65–100)
HBA1C MFR BLD: 5.9 % (ref 4.2–6.3)
P-R INTERVAL, ECG05: 172 MS
P-R INTERVAL, ECG05: 172 MS
Q-T INTERVAL, ECG07: 404 MS
Q-T INTERVAL, ECG07: 404 MS
QRS DURATION, ECG06: 96 MS
QRS DURATION, ECG06: 96 MS
QTC CALCULATION (BEZET), ECG08: 413 MS
QTC CALCULATION (BEZET), ECG08: 413 MS
SERVICE CMNT-IMP: ABNORMAL
VENTRICULAR RATE, ECG03: 63 BPM
VENTRICULAR RATE, ECG03: 63 BPM

## 2017-07-28 PROCEDURE — 65270000029 HC RM PRIVATE

## 2017-07-28 PROCEDURE — 77030032490 HC SLV COMPR SCD KNE COVD -B

## 2017-07-28 PROCEDURE — 77010033678 HC OXYGEN DAILY

## 2017-07-28 PROCEDURE — 93005 ELECTROCARDIOGRAM TRACING: CPT

## 2017-07-28 PROCEDURE — 74011250637 HC RX REV CODE- 250/637: Performed by: FAMILY MEDICINE

## 2017-07-28 PROCEDURE — 74011250637 HC RX REV CODE- 250/637: Performed by: PHYSICIAN ASSISTANT

## 2017-07-28 PROCEDURE — 82962 GLUCOSE BLOOD TEST: CPT

## 2017-07-28 PROCEDURE — 74011250636 HC RX REV CODE- 250/636

## 2017-07-28 PROCEDURE — 93306 TTE W/DOPPLER COMPLETE: CPT

## 2017-07-28 PROCEDURE — 74011636637 HC RX REV CODE- 636/637: Performed by: FAMILY MEDICINE

## 2017-07-28 RX ORDER — GABAPENTIN 100 MG/1
100 CAPSULE ORAL 3 TIMES DAILY
Status: DISCONTINUED | OUTPATIENT
Start: 2017-07-28 | End: 2017-08-03

## 2017-07-28 RX ORDER — BACLOFEN 20 MG
500 TABLET ORAL DAILY
Status: DISCONTINUED | OUTPATIENT
Start: 2017-07-28 | End: 2017-07-28 | Stop reason: SDUPTHER

## 2017-07-28 RX ORDER — ACETAMINOPHEN 160 MG/5ML
200 SUSPENSION, ORAL (FINAL DOSE FORM) ORAL DAILY
Status: DISCONTINUED | OUTPATIENT
Start: 2017-07-28 | End: 2017-08-04 | Stop reason: HOSPADM

## 2017-07-28 RX ORDER — MORPHINE SULFATE 2 MG/ML
4 INJECTION, SOLUTION INTRAMUSCULAR; INTRAVENOUS
Status: DISCONTINUED | OUTPATIENT
Start: 2017-07-28 | End: 2017-07-28

## 2017-07-28 RX ORDER — MORPHINE SULFATE 2 MG/ML
2-4 INJECTION, SOLUTION INTRAMUSCULAR; INTRAVENOUS
Status: DISCONTINUED | OUTPATIENT
Start: 2017-07-28 | End: 2017-07-30 | Stop reason: SDUPTHER

## 2017-07-28 RX ORDER — MORPHINE SULFATE 2 MG/ML
INJECTION, SOLUTION INTRAMUSCULAR; INTRAVENOUS
Status: DISPENSED
Start: 2017-07-28 | End: 2017-07-29

## 2017-07-28 RX ORDER — ACETAMINOPHEN 10 MG/ML
1000 INJECTION, SOLUTION INTRAVENOUS ONCE
Status: ACTIVE | OUTPATIENT
Start: 2017-07-28 | End: 2017-07-29

## 2017-07-28 RX ORDER — LANOLIN ALCOHOL/MO/W.PET/CERES
200 CREAM (GRAM) TOPICAL DAILY
Status: DISCONTINUED | OUTPATIENT
Start: 2017-07-29 | End: 2017-07-28

## 2017-07-28 RX ORDER — BACLOFEN 20 MG
500 TABLET ORAL DAILY
Status: DISCONTINUED | OUTPATIENT
Start: 2017-07-28 | End: 2017-08-04 | Stop reason: HOSPADM

## 2017-07-28 RX ADMIN — MORPHINE SULFATE 4 MG: 2 INJECTION, SOLUTION INTRAMUSCULAR; INTRAVENOUS at 21:36

## 2017-07-28 RX ADMIN — OXYCODONE HYDROCHLORIDE AND ACETAMINOPHEN 1 TABLET: 5; 325 TABLET ORAL at 11:31

## 2017-07-28 RX ADMIN — LISINOPRIL 20 MG: 20 TABLET ORAL at 08:30

## 2017-07-28 RX ADMIN — Medication 200 MG: at 14:24

## 2017-07-28 RX ADMIN — Medication 10 ML: at 14:24

## 2017-07-28 RX ADMIN — OXYCODONE HYDROCHLORIDE AND ACETAMINOPHEN 1 TABLET: 5; 325 TABLET ORAL at 15:46

## 2017-07-28 RX ADMIN — METOPROLOL TARTRATE 12.5 MG: 25 TABLET ORAL at 08:31

## 2017-07-28 RX ADMIN — OXYCODONE HYDROCHLORIDE AND ACETAMINOPHEN 1 TABLET: 5; 325 TABLET ORAL at 19:38

## 2017-07-28 RX ADMIN — Medication 10 ML: at 08:32

## 2017-07-28 RX ADMIN — GLIPIZIDE 5 MG: 5 TABLET, FILM COATED, EXTENDED RELEASE ORAL at 07:36

## 2017-07-28 RX ADMIN — OXYCODONE HYDROCHLORIDE AND ACETAMINOPHEN 1 TABLET: 5; 325 TABLET ORAL at 03:17

## 2017-07-28 RX ADMIN — Medication 10 ML: at 22:00

## 2017-07-28 RX ADMIN — INSULIN LISPRO 2 UNITS: 100 INJECTION, SOLUTION INTRAVENOUS; SUBCUTANEOUS at 18:02

## 2017-07-28 RX ADMIN — Medication 500 MG: at 14:24

## 2017-07-28 RX ADMIN — OXYCODONE HYDROCHLORIDE AND ACETAMINOPHEN 1 TABLET: 5; 325 TABLET ORAL at 07:36

## 2017-07-28 RX ADMIN — THERA TABS 1 TABLET: TAB at 08:32

## 2017-07-28 NOTE — CONSULTS
[address] [facility] [address], [address] [facility]    [facility] [address]       Name:  Sarahann Frankel   MR#:  [id number]   :  1935   Account #:  [de-identified]    Date of Consultation:  2017   Date of Adm:  2017       CHIEF COMPLAINT: Lower extremity pain. REASON FOR CONSULTATION: Medical management and   preoperative clearance. HISTORY OF PRESENT ILLNESS: The patient is an 74-year-old   gentleman with past medical history of hypertension, AV valve   replacement at [facility] in  with A pig valve,   history of hyperlipidemia and diabetes, who presents to the hospital   secondary to the above-mentioned symptoms. The patient has known   history of cervical and lumbar stenosis. He was seen by Dr. Diana Presley   on 2017 with complaint of leg pain and weakness and had a   triple MRI done. This morning the patient was walking with in the   house the help of a walker when he twisted to look behind him, had a   sudden onset of pain down his legs to the feet. The patient reports the   pain was so severe that he felt that he was going to fall. The patient   reports that he called EMS and came to the hospital because of the   pain. The patient was seen and evaluated by orthopedics and was   admitted to the hospital. Hospitalist service was requested to be   consulted for preoperative clearance and medical management. The   patient denies any other complaints or problems. Denies any   headache, blurry vision, sore throat, trouble swallowing, trouble with   speech, any chest pain, shortness of breath, cough, fever, chills,   abdominal pain, constipation, diarrhea, urinary symptoms, focal or   generalized neurological weakness, recent travel, sick contacts, falls,   injuries, trauma or any other concerns or problems. The patient denies   hematemesis, melena, hemoptysis. PAST MEDICAL HISTORY: See above. HOME MEDICATIONS: Currently the patient is on   1.  Glipizide 5 mg daily.   2. Lisinopril 20 mg daily. 3. Tramadol. 4. Acetaminophen 5/325 mg daily. 5. Aspirin 81 mg daily. 6. Coenzyme Q10.   7. Multivitamin. 8. Magnesium 250 mg daily. SOCIAL HISTORY: Smokes cigars occasionally. Denies IV drug   abuse. Occasional alcohol use. Lives at home. REVIEW OF SYSTEMS: All systems were reviewed and were found to   be essentially negative. ALLERGIES: PENICILLIN. FAMILY HISTORY: Was discussed, was found to be noncontributory. PHYSICAL EXAMINATION   VITAL SIGNS: Temperature 98.4, pulse 77, respiratory rate 16, blood   pressure 160/94, pulse oximetry 100% on room air. GENERAL: Alert and oriented x3, awake, moderately distressed   pleasant male, appears to be stated age. HEENT: Pupils equal and reactive to light. Dry mucous membranes. Tympanic membranes clear. NECK: Supple. CHEST: Clear to auscultation bilaterally. CORONARY: S1, S2 were heard. ABDOMEN: Soft, nontender, nondistended. Bowel sounds are   physiological.   EXTREMITIES: Significant pain with moving lower extremities, bilateral   foot drop was noted. Good pulses. NEUROLOGIC/PSYCHIATRIC:   Limited exam as significant pain in bilateral lower extremity. Good   strength. 5/5 strength bilateral upper extremities. Cranial nerves 2-12   grossly intact. SKIN: Warm. LABORATORY DATA: White count 7, hemoglobin 11.7, hematocrit   35.5, platelets 767,501. Sodium 140, potassium 4.8, chloride 106,   bicarbonate 28, anion gap 6, glucose , creatinine 1.37, calcium 8.5,   bilirubin total 0.4, ALT 24, AST 19, alkaline phosphatase 65. MRI of the cervical spine, lumbar spine and thoracic spine were   reviewed. ASSESSMENT AND PLAN   1. Bilateral lower extremity pain, most likely secondary to spinal canal   stenosis. The patient is being admitted to the orthopedic service. Steroids and IV pain medication per primary, continue to monitor. 2. Preoperative clearance.  The patient has a history of hypertension,   hyperlipidemia, diabetes, is going to be an intermediate risk for   surgery. Will get an echocardiogram in light of (atrioventicular) AV   valve replacement. Continue to monitor. No further testing is needed. We will also get an electrocardiogram (EKG) that was not done in the   ER. We will start the patient on low-dose beta blockers if tolerated by   his blood pressures for better postoperative outcomes. Further   intervention will be per hospital course. 3. Hypertension. Continue home medications and continue to monitor. We will add low-dose beta blockers. 4. Diabetes. The patient with sliding scale NovoLog insulin, Accu-  Cheks, diet control and will continue to closely monitor. We will   continue oral hypoglycemics for now. We will hold 24 hours prior to   surgery. 5. Gastrointestinal and deep venous thrombosis prophylaxis per   primary.         Nicole Morelos MD MM / Karishma.Valerie   D:  07/27/2017   19:31   T:  07/27/2017   20:06   Job #:  [id number]

## 2017-07-28 NOTE — PROGRESS NOTES
Hospitalist Progress Note      Hospital summary: this is an [de-identified] y.o man with HTN and a bioprosthetic aortic valve replacement who presented with progressive bilateral leg pain and new bilateral foot drop. He is found to have severe spinal stenosis and is admitted in anticipation of surgery for this. We are consulted for medical management. Assessment/Plan:  HTN: BP currently at goal; continue current regimen and monitor    Type II DM: blood glucose at goal  -continue glipizide and SSI; hold glipizide the day before and of surgery  -monitor BG    History of bioprosthetic aortic valve: no pathologic murmur; will f/u echocardiogram.    Mild creatinine elevation: he has no known kidney disease but serum creatinine was mildly elevated in 2015 and now. Encourage PO hydration. He will need to follow-up with his PCP to monitor renal function. Spinal stenosis: management per primary team    ----------------------------------------------    CC: leg pain    S: he complains of burning, tingling, leg pain bilaterally below the knees, in addition to low back pain, any movement worsens the pain. No chest pain, dyspnea, n/v/d. Review of Systems:  Pertinent items are noted in HPI.     O:  Visit Vitals    /69    Pulse 79    Temp 98 °F (36.7 °C)    Resp 16    Ht 5' 10\" (1.778 m)    SpO2 95%       PHYSICAL EXAM:  Gen: NAD, non-toxic  HEENT: anicteric sclerae, normal conjunctiva, oropharynx clear, MM moist  Neck: supple  Heart: RRR, soft systolic murmur, no JVD, no peripheral edema  Lungs: CTA b/l, non-labored respirations  Abd: soft, NT, ND, BS+  Extr: warm  Skin: dry, no rash  Neuro: CN II-XII grossly intact, normal speech  Psych: normal mood, appropriate affect      Intake/Output Summary (Last 24 hours) at 07/28/17 1107  Last data filed at 07/28/17 0700   Gross per 24 hour   Intake                0 ml   Output             1500 ml   Net            -1500 ml        Recent labs & imaging reviewed:  Recent Labs      07/27/17   1327   WBC  7.0   HGB  11.7*   HCT  35.5*   PLT  199     Recent Labs      07/27/17   1327   NA  140   K  4.8   CL  106   CO2  28   BUN  35*   CREA  1.37*   GLU  97   CA  8.5     Recent Labs      07/27/17   1327   SGOT  19   ALT  24   AP  65   TBILI  0.4   TP  6.8   ALB  3.7   GLOB  3.1       Med list reviewed  Current Facility-Administered Medications   Medication Dose Route Frequency    [START ON 7/29/2017] magnesium oxide (MAG-OX) tablet 200 mg  200 mg Oral DAILY    sodium chloride (NS) flush 5-10 mL  5-10 mL IntraVENous Q8H    sodium chloride (NS) flush 5-10 mL  5-10 mL IntraVENous PRN    acetaminophen (TYLENOL) tablet 650 mg  650 mg Oral Q4H PRN    oxyCODONE-acetaminophen (PERCOCET) 5-325 mg per tablet 1 Tab  1 Tab Oral Q4H PRN    diphenhydrAMINE (BENADRYL) injection 12.5 mg  12.5 mg IntraVENous Q4H PRN    ondansetron (ZOFRAN) injection 4 mg  4 mg IntraVENous Q4H PRN    bisacodyl (DULCOLAX) suppository 10 mg  10 mg Rectal DAILY PRN    glipiZIDE SR (GLUCOTROL XL) tablet 5 mg  5 mg Oral ACB    lisinopril (PRINIVIL, ZESTRIL) tablet 20 mg  20 mg Oral DAILY    therapeutic multivitamin (THERAGRAN) tablet 1 Tab  1 Tab Oral DAILY    glucose chewable tablet 16 g  4 Tab Oral PRN    glucagon (GLUCAGEN) injection 1 mg  1 mg IntraMUSCular PRN    insulin lispro (HUMALOG) injection   SubCUTAneous AC&HS    dextrose 10 % infusion 125-250 mL  125-250 mL IntraVENous PRN    metoprolol tartrate (LOPRESSOR) tablet 12.5 mg  12.5 mg Oral Q12H       Care Plan discussed with:  Patient/Family    Thanh San MD  Internal Medicine  Date of Service: 7/28/2017

## 2017-07-28 NOTE — PROGRESS NOTES
BP 98/49 at evening vitals. Asymptomatic. Spoke with SEN Thompson-Plestefany. Orders received: If SBP <100 at evening vitals (@2000), give one time bolus of 500 NS. Will continue to monitor pt.

## 2017-07-28 NOTE — PROGRESS NOTES
Patient is an [de-identified] y/o   male insured by [facility] A[facility] (Sonny Pane secondary), admitted to [address] [place] [address] 7/27/17 for c/c BLE pain. Patient scheduled for surgery on Tuesday 8/1/17 (Dr. Stewart Alvarez). Bundled patient. CM visited patient at bedside. Patient A&Ox4. Confirmed demographics on facesheet. Patient lives alone in [facility]. DME includes walker and 4-prong cane. Patient independent with ADL/IADL's. Discharge needs pending surgical outcomes and therapy recommendations, expect potential need for rehab when medically clear. Patient requests [address] [facility], as this is facility located closest to his family. CM explained different levels of rehab and that given patient's independence PTA, he may benefit more from acute rehab. CM provided rehab facility list for patient to review. CM will continue to follow next week for discharge needs post-op. [facility]    Care Management Interventions  PCP Verified by CM: Yes (approx. one week ago)  Current Support Network: Lives Alone  Plan discussed with Pt/Family/Caregiver:  Yes

## 2017-07-28 NOTE — PROGRESS NOTES
ORTHOPAEDIC PROGRESS NOTE    NAME: Mariola Zuleta   :  1935   MRN:  [id number]   DATE:  2017    POD: * No surgery found *  S/P:     SUBJECTIVE:    Patient is lying in bed with the head of the bed elevated and appears well. Getting ready to eat breakfast. Pain seems to be managed. He cannot tolerate compression hose due to severe pain in the feet. He presented to the ED yesterday with complaints of sudden onset bilateral lower extremity pain. He was scheduled for MRI yesterday, but imaging was cancelled due to ED visit. Triple study MRI was obtained via the ED. He was admitted for intractable pain and findings on MRI. Afebrile/VSS. Voiding. Recent Labs      17   1327   HGB  11.7*   HCT  35.5*   NA  140   K  4.8   CL  106   CO2  28   BUN  35*   CREA  1.37*   GLU  97     Patient Vitals for the past 12 hrs:   BP Temp Pulse Resp SpO2   17 0601 126/66 97.7 °F (36.5 °C) 64 16 98 %   17 2311 119/70 - 72 - -   17 2108 116/68 98.6 °F (37 °C) 76 16 98 %   17 1914 (!) 160/94 98.4 °F (36.9 °C) 77 16 100 %     EXAM:  Quadriceps 5/5 bilaterally  Hip flexors 5/5 on the left and 4/5 on the right  Dorsiflexion, eversion, plantar flexion 0/5 on the right. Tender to the touch   Dorsiflexion, eversion, plantar flexion 3/5 on the left  Sensation intact at the trunk and lower extremities    RESULTS:   Lumbar impression: Severe chronic degenerative changes with diffuse spinal stenosis as  above.     Thoracic impression: Multifocal level degenerative change with severe canal stenosis at T8-9 with  associated cord edema.  Moderate narrowing of the canal at T10-T11 with associated cord edema  Severe narrowing of the canal L1-L2 with left paracentral disc extrusion  narrowing the left subarticular zone and left lateral recess posterior to L2     Cervical impression: Post surgical and degenerative changes with multilevel spinal and  foraminal stenosis    PLAN:  PO pain medications as needed  Ambulate with assistance  Advance diabetic diet  Ice packs to back for PRN pain  VTE prophylaxis: SEDs.  Cannot tolerate compression hose  Encourage use of ICS  Surgery scheduled for next week  Hospitalist consulted for medical management - thank you for your assistance   ECG and ECHO scheduled    Eldaio Mendez PA-C

## 2017-07-28 NOTE — PROGRESS NOTES
Bedside shift change report given to Beth Cohen (oncoming nurse) by Joe Azar (offgoing nurse). Report included the following information SBAR, Kardex, ED Summary, Intake/Output, MAR and Recent Results. I agree with Lupe's documentation.

## 2017-07-29 LAB
GLUCOSE BLD STRIP.AUTO-MCNC: 126 MG/DL (ref 65–100)
GLUCOSE BLD STRIP.AUTO-MCNC: 130 MG/DL (ref 65–100)
GLUCOSE BLD STRIP.AUTO-MCNC: 52 MG/DL (ref 65–100)
GLUCOSE BLD STRIP.AUTO-MCNC: 97 MG/DL (ref 65–100)
SERVICE CMNT-IMP: ABNORMAL
SERVICE CMNT-IMP: NORMAL

## 2017-07-29 PROCEDURE — 82962 GLUCOSE BLOOD TEST: CPT

## 2017-07-29 PROCEDURE — 74011250637 HC RX REV CODE- 250/637: Performed by: NURSE PRACTITIONER

## 2017-07-29 PROCEDURE — 65270000029 HC RM PRIVATE

## 2017-07-29 PROCEDURE — 74011250637 HC RX REV CODE- 250/637: Performed by: PHYSICIAN ASSISTANT

## 2017-07-29 PROCEDURE — 74011250637 HC RX REV CODE- 250/637: Performed by: HOSPITALIST

## 2017-07-29 PROCEDURE — 74011250637 HC RX REV CODE- 250/637: Performed by: FAMILY MEDICINE

## 2017-07-29 RX ORDER — LISINOPRIL 5 MG/1
5 TABLET ORAL DAILY
Status: DISCONTINUED | OUTPATIENT
Start: 2017-07-29 | End: 2017-08-04 | Stop reason: HOSPADM

## 2017-07-29 RX ADMIN — Medication 500 MG: at 10:03

## 2017-07-29 RX ADMIN — OXYCODONE HYDROCHLORIDE AND ACETAMINOPHEN 1 TABLET: 5; 325 TABLET ORAL at 15:29

## 2017-07-29 RX ADMIN — OXYCODONE HYDROCHLORIDE AND ACETAMINOPHEN 1 TABLET: 5; 325 TABLET ORAL at 07:42

## 2017-07-29 RX ADMIN — Medication 10 ML: at 07:43

## 2017-07-29 RX ADMIN — GABAPENTIN 100 MG: 100 CAPSULE ORAL at 00:30

## 2017-07-29 RX ADMIN — Medication 10 ML: at 15:33

## 2017-07-29 RX ADMIN — OXYCODONE HYDROCHLORIDE AND ACETAMINOPHEN 1 TABLET: 5; 325 TABLET ORAL at 11:39

## 2017-07-29 RX ADMIN — GABAPENTIN 100 MG: 100 CAPSULE ORAL at 09:44

## 2017-07-29 RX ADMIN — OXYCODONE HYDROCHLORIDE AND ACETAMINOPHEN 1 TABLET: 5; 325 TABLET ORAL at 18:35

## 2017-07-29 RX ADMIN — GABAPENTIN 100 MG: 100 CAPSULE ORAL at 22:21

## 2017-07-29 RX ADMIN — METOPROLOL TARTRATE 12.5 MG: 25 TABLET ORAL at 22:21

## 2017-07-29 RX ADMIN — THERA TABS 1 TABLET: TAB at 09:45

## 2017-07-29 RX ADMIN — GLIPIZIDE 5 MG: 5 TABLET, FILM COATED, EXTENDED RELEASE ORAL at 07:42

## 2017-07-29 RX ADMIN — Medication 10 ML: at 22:24

## 2017-07-29 RX ADMIN — GABAPENTIN 100 MG: 100 CAPSULE ORAL at 15:29

## 2017-07-29 RX ADMIN — OXYCODONE HYDROCHLORIDE AND ACETAMINOPHEN 1 TABLET: 5; 325 TABLET ORAL at 00:30

## 2017-07-29 RX ADMIN — Medication 200 MG: at 10:03

## 2017-07-29 NOTE — PROGRESS NOTES
Hospitalist Progress Note      Hospital summary: this is an [de-identified] y.o man with HTN and a bioprosthetic aortic valve replacement who presented with progressive bilateral leg pain and new bilateral foot drop. He is found to have severe spinal stenosis and is admitted in anticipation of surgery for this. We are consulted for medical management. Assessment/Plan:  HTN: hypotensive overnight  -reduce lisinopril dose to 5 mg, placed hold parameters    Type II DM: hypoglycemic this AM  -stop glipizide  -continue SSI  -monitor BG    History of bioprosthetic aortic valve: wnl on TTE    Mild creatinine elevation: he has no known kidney disease but serum creatinine was mildly elevated in 2015 and now. Encourage PO hydration. He will need to follow-up with his PCP to monitor renal function. Spinal stenosis: management per primary team    ----------------------------------------------    CC: leg pain    S: continues having severe b/l leg pain below the knees, in addition to low back pain, any movement worsens the pain. No chest pain, dyspnea, n/v/d. Review of Systems:  Pertinent items are noted in HPI.     O:  Visit Vitals    /65 (BP 1 Location: Right arm, BP Patient Position: At rest)    Pulse 74    Temp 98.2 °F (36.8 °C)    Resp 16    Ht 5' 10\" (1.778 m)    SpO2 99%       PHYSICAL EXAM:  Gen: NAD, non-toxic  HEENT: anicteric sclerae  Neck: supple  Heart: RRR, soft systolic murmur, no JVD, no peripheral edema  Lungs: CTA b/l, non-labored respirations  Abd: soft, NT, ND  Extr: warm  Skin: dry, no rash  Neuro: CN II-XII grossly intact, normal speech  Psych: normal mood, appropriate affect    No intake or output data in the 24 hours ending 07/29/17 0940     Recent labs & imaging reviewed:  Recent Labs      07/27/17   1327   WBC  7.0   HGB  11.7*   HCT  35.5*   PLT  199     Recent Labs      07/27/17   1327   NA  140   K  4.8   CL  106   CO2  28   BUN  35*   CREA  1.37*   GLU  97   CA 8.5     Recent Labs      07/27/17   1327   SGOT  19   ALT  24   AP  65   TBILI  0.4   TP  6.8   ALB  3.7   GLOB  3.1       Med list reviewed  Current Facility-Administered Medications   Medication Dose Route Frequency    lisinopril (PRINIVIL, ZESTRIL) tablet 5 mg  5 mg Oral DAILY    coenzyme Q-10 (CO Q-10) capsule 200 mg  200 mg Oral DAILY    magnesium oxide tab 500 mg  500 mg Oral DAILY    morphine 2 mg/mL injection        morphine injection 2-4 mg  2-4 mg IntraVENous Q4H PRN    gabapentin (NEURONTIN) capsule 100 mg  100 mg Oral TID    acetaminophen (OFIRMEV) infusion 1,000 mg  1,000 mg IntraVENous ONCE    sodium chloride (NS) flush 5-10 mL  5-10 mL IntraVENous Q8H    sodium chloride (NS) flush 5-10 mL  5-10 mL IntraVENous PRN    acetaminophen (TYLENOL) tablet 650 mg  650 mg Oral Q4H PRN    oxyCODONE-acetaminophen (PERCOCET) 5-325 mg per tablet 1 Tab  1 Tab Oral Q4H PRN    diphenhydrAMINE (BENADRYL) injection 12.5 mg  12.5 mg IntraVENous Q4H PRN    ondansetron (ZOFRAN) injection 4 mg  4 mg IntraVENous Q4H PRN    bisacodyl (DULCOLAX) suppository 10 mg  10 mg Rectal DAILY PRN    therapeutic multivitamin (THERAGRAN) tablet 1 Tab  1 Tab Oral DAILY    glucose chewable tablet 16 g  4 Tab Oral PRN    glucagon (GLUCAGEN) injection 1 mg  1 mg IntraMUSCular PRN    insulin lispro (HUMALOG) injection   SubCUTAneous AC&HS    dextrose 10 % infusion 125-250 mL  125-250 mL IntraVENous PRN    metoprolol tartrate (LOPRESSOR) tablet 12.5 mg  12.5 mg Oral Q12H       Care Plan discussed with:  Patient/Family    Elo Nicholson MD  Internal Medicine  Date of Service: 7/29/2017

## 2017-07-29 NOTE — PROGRESS NOTES
Patient is admitted awaiting surgery on Tuesday. Doing okay today. Last p.m. Had severe pain to leg, now better. AFVSS, elderly male, no distress. SCDs intact. Pain to exam of bilateral lower extremities. Cap refill intact. Stable. Awaiting surgery. Continue current pain management.

## 2017-07-29 NOTE — PROGRESS NOTES
Dr Jarrod Celeste called due to pt c/o of sudden onset of severe pain to lower extremities (10/10). Received order for pt to have 2-4mg iv mso4 q4hr prn. Pt medicated with 4mg iv with relief of  pain to a 2/10 w/i a short period of time.

## 2017-07-30 LAB
GLUCOSE BLD STRIP.AUTO-MCNC: 105 MG/DL (ref 65–100)
GLUCOSE BLD STRIP.AUTO-MCNC: 108 MG/DL (ref 65–100)
GLUCOSE BLD STRIP.AUTO-MCNC: 109 MG/DL (ref 65–100)
GLUCOSE BLD STRIP.AUTO-MCNC: 109 MG/DL (ref 65–100)
SERVICE CMNT-IMP: ABNORMAL

## 2017-07-30 PROCEDURE — 74011250637 HC RX REV CODE- 250/637: Performed by: PHYSICIAN ASSISTANT

## 2017-07-30 PROCEDURE — 74011250637 HC RX REV CODE- 250/637: Performed by: FAMILY MEDICINE

## 2017-07-30 PROCEDURE — 74011250637 HC RX REV CODE- 250/637: Performed by: HOSPITALIST

## 2017-07-30 PROCEDURE — 65270000029 HC RM PRIVATE

## 2017-07-30 PROCEDURE — 74011250637 HC RX REV CODE- 250/637: Performed by: NURSE PRACTITIONER

## 2017-07-30 PROCEDURE — 82962 GLUCOSE BLOOD TEST: CPT

## 2017-07-30 RX ORDER — MORPHINE SULFATE 2 MG/ML
2 INJECTION, SOLUTION INTRAMUSCULAR; INTRAVENOUS
Status: DISCONTINUED | OUTPATIENT
Start: 2017-07-30 | End: 2017-07-31

## 2017-07-30 RX ORDER — DOCUSATE SODIUM 100 MG/1
100 CAPSULE, LIQUID FILLED ORAL DAILY
Status: DISCONTINUED | OUTPATIENT
Start: 2017-07-31 | End: 2017-08-01

## 2017-07-30 RX ADMIN — GABAPENTIN 100 MG: 100 CAPSULE ORAL at 21:34

## 2017-07-30 RX ADMIN — Medication 10 ML: at 06:35

## 2017-07-30 RX ADMIN — THERA TABS 1 TABLET: TAB at 12:40

## 2017-07-30 RX ADMIN — Medication 10 ML: at 21:34

## 2017-07-30 RX ADMIN — Medication 500 MG: at 12:38

## 2017-07-30 RX ADMIN — LISINOPRIL 5 MG: 5 TABLET ORAL at 12:39

## 2017-07-30 RX ADMIN — OXYCODONE HYDROCHLORIDE AND ACETAMINOPHEN 1 TABLET: 5; 325 TABLET ORAL at 15:26

## 2017-07-30 RX ADMIN — Medication 10 ML: at 13:58

## 2017-07-30 RX ADMIN — GABAPENTIN 100 MG: 100 CAPSULE ORAL at 12:40

## 2017-07-30 RX ADMIN — GABAPENTIN 100 MG: 100 CAPSULE ORAL at 15:26

## 2017-07-30 RX ADMIN — OXYCODONE HYDROCHLORIDE AND ACETAMINOPHEN 1 TABLET: 5; 325 TABLET ORAL at 19:55

## 2017-07-30 RX ADMIN — METOPROLOL TARTRATE 12.5 MG: 25 TABLET ORAL at 21:34

## 2017-07-30 RX ADMIN — Medication 200 MG: at 12:38

## 2017-07-30 RX ADMIN — METOPROLOL TARTRATE 12.5 MG: 25 TABLET ORAL at 12:40

## 2017-07-30 RX ADMIN — OXYCODONE HYDROCHLORIDE AND ACETAMINOPHEN 1 TABLET: 5; 325 TABLET ORAL at 06:35

## 2017-07-30 RX ADMIN — OXYCODONE HYDROCHLORIDE AND ACETAMINOPHEN 1 TABLET: 5; 325 TABLET ORAL at 11:32

## 2017-07-30 NOTE — PROGRESS NOTES
Hospitalist Progress Note      Hospital summary: this is an [de-identified] y.o man with HTN and a bioprosthetic aortic valve replacement who presented with progressive bilateral leg pain and new bilateral foot drop. He is found to have severe spinal stenosis and is admitted in anticipation of surgery for this. We are consulted for medical management. Assessment/Plan:  HTN: BP stable on current regimen  -resume home med regimen on discharge  -reduce lisinopril dose to 5 mg, placed hold parameters  -please place on maintenance IV fluids while NPO for surgery    Type II DM:  -stopped glipizide due to hypoglycemia; resume on discharge  -continue SSI  -monitor BG    History of bioprosthetic aortic valve: wnl on TTE    Mild creatinine elevation: he has no known kidney disease but serum creatinine was mildly elevated in 2015 and now. Encourage PO hydration. He will need to follow-up with his PCP to monitor renal function. Spinal stenosis: management per primary team    Medical issues are stable. Will sign-off.    ----------------------------------------------    CC: leg pain    S: pain better controlled after IV morphine. No chest pain, dyspnea, dizziness, n/v/d. Review of Systems:  Pertinent items are noted in HPI. O:  Visit Vitals    /68    Pulse 76    Temp 98.3 °F (36.8 °C)    Resp 16    Ht 5' 10\" (1.778 m)    SpO2 98%       PHYSICAL EXAM:  Gen: NAD, non-toxic  HEENT: anicteric sclerae  Neck: supple  Heart: RRR, soft systolic murmur, no JVD, no peripheral edema  Lungs: CTA b/l, non-labored respirations  Abd: soft, NT, ND  Extr: warm  Skin: dry, no rash  Neuro: CN II-XII grossly intact, normal speech  Psych: normal mood, appropriate affect      Intake/Output Summary (Last 24 hours) at 07/30/17 1831  Last data filed at 07/30/17 1620   Gross per 24 hour   Intake                0 ml   Output             1375 ml   Net            -1375 ml        Recent labs & imaging reviewed:   No results for input(s): WBC, HGB, HCT, PLT, HGBEXT, HCTEXT, PLTEXT, HGBEXT, HCTEXT, PLTEXT in the last 72 hours. No results for input(s): NA, K, CL, CO2, BUN, CREA, GLU, CA, MG, PHOS, URICA in the last 72 hours. No results for input(s): SGOT, GPT, ALT, AP, TBIL, TBILI, TP, ALB, GLOB, GGT, AML, LPSE in the last 72 hours.     No lab exists for component: AMYP, HLPSE    Med list reviewed  Current Facility-Administered Medications   Medication Dose Route Frequency    morphine injection 2 mg  2 mg IntraVENous Q2H PRN    [START ON 7/31/2017] docusate sodium (COLACE) capsule 100 mg  100 mg Oral DAILY    lisinopril (PRINIVIL, ZESTRIL) tablet 5 mg  5 mg Oral DAILY    coenzyme Q-10 (CO Q-10) capsule 200 mg  200 mg Oral DAILY    magnesium oxide tab 500 mg  500 mg Oral DAILY    gabapentin (NEURONTIN) capsule 100 mg  100 mg Oral TID    sodium chloride (NS) flush 5-10 mL  5-10 mL IntraVENous Q8H    sodium chloride (NS) flush 5-10 mL  5-10 mL IntraVENous PRN    acetaminophen (TYLENOL) tablet 650 mg  650 mg Oral Q4H PRN    oxyCODONE-acetaminophen (PERCOCET) 5-325 mg per tablet 1 Tab  1 Tab Oral Q4H PRN    diphenhydrAMINE (BENADRYL) injection 12.5 mg  12.5 mg IntraVENous Q4H PRN    ondansetron (ZOFRAN) injection 4 mg  4 mg IntraVENous Q4H PRN    bisacodyl (DULCOLAX) suppository 10 mg  10 mg Rectal DAILY PRN    therapeutic multivitamin (THERAGRAN) tablet 1 Tab  1 Tab Oral DAILY    glucose chewable tablet 16 g  4 Tab Oral PRN    glucagon (GLUCAGEN) injection 1 mg  1 mg IntraMUSCular PRN    insulin lispro (HUMALOG) injection   SubCUTAneous AC&HS    dextrose 10 % infusion 125-250 mL  125-250 mL IntraVENous PRN    metoprolol tartrate (LOPRESSOR) tablet 12.5 mg  12.5 mg Oral Q12H       Care Plan discussed with:  Patient/Family    Nyasia Ramos MD  Internal Medicine  Date of Service: 7/30/2017

## 2017-07-30 NOTE — PROGRESS NOTES
Patient awaiting surgery on Tuesday, complaint of \"severe\" bilateral leg pain that is intermittent. Alert and oriented x3, mild distress related to leg pain. AFVSS. Exam stable and same as yesterday. Awaiting surgery Tuesday. Pain management, adding Morphine 2 mg IV q2 prn 'severe\" or breakthrough pain. Stool softner.

## 2017-07-31 ENCOUNTER — ANESTHESIA EVENT (OUTPATIENT)
Dept: SURGERY | Age: 82
DRG: 460 | End: 2017-07-31
Payer: MEDICARE

## 2017-07-31 LAB
APPEARANCE UR: CLEAR
BACTERIA URNS QL MICRO: NEGATIVE /HPF
BILIRUB UR QL: NEGATIVE
COLOR UR: ABNORMAL
EPITH CASTS URNS QL MICRO: ABNORMAL /LPF
GLUCOSE BLD STRIP.AUTO-MCNC: 113 MG/DL (ref 65–100)
GLUCOSE BLD STRIP.AUTO-MCNC: 130 MG/DL (ref 65–100)
GLUCOSE BLD STRIP.AUTO-MCNC: 131 MG/DL (ref 65–100)
GLUCOSE BLD STRIP.AUTO-MCNC: 135 MG/DL (ref 65–100)
GLUCOSE UR STRIP.AUTO-MCNC: NEGATIVE MG/DL
HGB UR QL STRIP: ABNORMAL
HYALINE CASTS URNS QL MICRO: ABNORMAL /LPF (ref 0–5)
INR PPP: 1 (ref 0.9–1.1)
KETONES UR QL STRIP.AUTO: NEGATIVE MG/DL
LEUKOCYTE ESTERASE UR QL STRIP.AUTO: ABNORMAL
NITRITE UR QL STRIP.AUTO: NEGATIVE
PH UR STRIP: 6.5 [PH] (ref 5–8)
PROT UR STRIP-MCNC: NEGATIVE MG/DL
PROTHROMBIN TIME: 10.7 SEC (ref 9–11.1)
RBC #/AREA URNS HPF: ABNORMAL /HPF (ref 0–5)
SERVICE CMNT-IMP: ABNORMAL
SP GR UR REFRACTOMETRY: 1.01 (ref 1–1.03)
UA: UC IF INDICATED,UAUC: ABNORMAL
UROBILINOGEN UR QL STRIP.AUTO: 0.2 EU/DL (ref 0.2–1)
WBC URNS QL MICRO: ABNORMAL /HPF (ref 0–4)

## 2017-07-31 PROCEDURE — 74011250636 HC RX REV CODE- 250/636: Performed by: ORTHOPAEDIC SURGERY

## 2017-07-31 PROCEDURE — 65270000029 HC RM PRIVATE

## 2017-07-31 PROCEDURE — 81001 URINALYSIS AUTO W/SCOPE: CPT | Performed by: PHYSICIAN ASSISTANT

## 2017-07-31 PROCEDURE — 85610 PROTHROMBIN TIME: CPT | Performed by: PHYSICIAN ASSISTANT

## 2017-07-31 PROCEDURE — 74011250637 HC RX REV CODE- 250/637: Performed by: ORTHOPAEDIC SURGERY

## 2017-07-31 PROCEDURE — 74011250637 HC RX REV CODE- 250/637: Performed by: PHYSICIAN ASSISTANT

## 2017-07-31 PROCEDURE — 74011250637 HC RX REV CODE- 250/637: Performed by: HOSPITALIST

## 2017-07-31 PROCEDURE — 74011250637 HC RX REV CODE- 250/637: Performed by: FAMILY MEDICINE

## 2017-07-31 PROCEDURE — 82962 GLUCOSE BLOOD TEST: CPT

## 2017-07-31 PROCEDURE — 86923 COMPATIBILITY TEST ELECTRIC: CPT | Performed by: PHYSICIAN ASSISTANT

## 2017-07-31 PROCEDURE — 74011250637 HC RX REV CODE- 250/637: Performed by: NURSE PRACTITIONER

## 2017-07-31 PROCEDURE — 36415 COLL VENOUS BLD VENIPUNCTURE: CPT | Performed by: PHYSICIAN ASSISTANT

## 2017-07-31 PROCEDURE — 74011250636 HC RX REV CODE- 250/636: Performed by: PHYSICIAN ASSISTANT

## 2017-07-31 PROCEDURE — 86900 BLOOD TYPING SEROLOGIC ABO: CPT | Performed by: PHYSICIAN ASSISTANT

## 2017-07-31 RX ORDER — HYDROMORPHONE HYDROCHLORIDE 2 MG/ML
1-2 INJECTION, SOLUTION INTRAMUSCULAR; INTRAVENOUS; SUBCUTANEOUS
Status: DISCONTINUED | OUTPATIENT
Start: 2017-07-31 | End: 2017-08-02 | Stop reason: CLARIF

## 2017-07-31 RX ADMIN — METOPROLOL TARTRATE 12.5 MG: 25 TABLET ORAL at 08:37

## 2017-07-31 RX ADMIN — HYDROMORPHONE HYDROCHLORIDE 2 MG: 2 INJECTION, SOLUTION INTRAMUSCULAR; INTRAVENOUS; SUBCUTANEOUS at 12:40

## 2017-07-31 RX ADMIN — ONDANSETRON 4 MG: 2 INJECTION INTRAMUSCULAR; INTRAVENOUS at 23:16

## 2017-07-31 RX ADMIN — LISINOPRIL 5 MG: 5 TABLET ORAL at 08:36

## 2017-07-31 RX ADMIN — Medication 5 ML: at 22:00

## 2017-07-31 RX ADMIN — Medication 10 ML: at 11:37

## 2017-07-31 RX ADMIN — Medication 10 ML: at 06:33

## 2017-07-31 RX ADMIN — MORPHINE SULFATE 2 MG: 2 INJECTION, SOLUTION INTRAMUSCULAR; INTRAVENOUS at 11:37

## 2017-07-31 RX ADMIN — THERA TABS 1 TABLET: TAB at 08:37

## 2017-07-31 RX ADMIN — Medication 500 MG: at 08:41

## 2017-07-31 RX ADMIN — GABAPENTIN 100 MG: 100 CAPSULE ORAL at 08:36

## 2017-07-31 RX ADMIN — GABAPENTIN 100 MG: 100 CAPSULE ORAL at 17:20

## 2017-07-31 RX ADMIN — OXYCODONE HYDROCHLORIDE AND ACETAMINOPHEN 1 TABLET: 5; 325 TABLET ORAL at 10:37

## 2017-07-31 RX ADMIN — HYDROMORPHONE HYDROCHLORIDE 2 MG: 2 INJECTION, SOLUTION INTRAMUSCULAR; INTRAVENOUS; SUBCUTANEOUS at 23:08

## 2017-07-31 RX ADMIN — Medication 200 MG: at 08:41

## 2017-07-31 RX ADMIN — Medication 10 ML: at 17:20

## 2017-07-31 RX ADMIN — HYDROMORPHONE HYDROCHLORIDE 2 MG: 2 INJECTION, SOLUTION INTRAMUSCULAR; INTRAVENOUS; SUBCUTANEOUS at 18:52

## 2017-07-31 RX ADMIN — GABAPENTIN 100 MG: 100 CAPSULE ORAL at 21:08

## 2017-07-31 RX ADMIN — DOCUSATE SODIUM 100 MG: 100 CAPSULE, LIQUID FILLED ORAL at 08:36

## 2017-07-31 RX ADMIN — OXYCODONE HYDROCHLORIDE AND ACETAMINOPHEN 1 TABLET: 5; 325 TABLET ORAL at 14:54

## 2017-07-31 RX ADMIN — OXYCODONE HYDROCHLORIDE AND ACETAMINOPHEN 1 TABLET: 5; 325 TABLET ORAL at 02:07

## 2017-07-31 RX ADMIN — METOPROLOL TARTRATE 12.5 MG: 25 TABLET ORAL at 21:08

## 2017-07-31 RX ADMIN — OXYCODONE HYDROCHLORIDE AND ACETAMINOPHEN 1 TABLET: 5; 325 TABLET ORAL at 06:32

## 2017-07-31 NOTE — PROGRESS NOTES
Called [facility] regarding pt's 10/10 pain, and pt stating the morphine was not working for him. Order obtained from [address] [place] [address], Medical Assistant for Dr. Miller Daily for dilaudid, 1-2 mg, IV, Q4H, PRN for severe pain and to discontinue the 2 mg IV morphine.

## 2017-07-31 NOTE — PROGRESS NOTES
ORTHOPAEDIC PROGRESS NOTE    NAME: Esther Morris   :  1935   MRN:  [id number]   DATE:  2017    THORACOLUMBAR FUSION SCHEDULED FOR 17    SUBJECTIVE:    Patient is lying in bed with the head of the bed elevated and is asleep. He is easily awakened. Pain seems to be managed. He cannot tolerate compression hose due to severe pain in the feet. No nausea/vomiting. Afebrile/VSS. Voiding. No results for input(s): HGB, HCT, INR, NA, K, CL, CO2, BUN, CREA, GLU, HGBEXT, HCTEXT in the last 72 hours. No lab exists for component: INREXT  Patient Vitals for the past 12 hrs:   BP Temp Pulse Resp SpO2   17 0209 113/68 98.1 °F (36.7 °C) 71 18 98 %   17 1949 128/65 98.8 °F (37.1 °C) 72 18 100 %     EXAM:  Quadriceps 5/5 bilaterally  Hip flexors 5/5 on the left and 4/5 on the right  Dorsiflexion, eversion, plantar flexion 0/5 on the right. Tender to the touch   Dorsiflexion, eversion, plantar flexion 3/5 on the left  Sensation intact at the trunk and lower extremities    RESULTS:   Lumbar impression: Severe chronic degenerative changes with diffuse spinal stenosis as  above.      Thoracic impression: Multifocal level degenerative change with severe canal stenosis at T8-9 with  associated cord edema. Moderate narrowing of the canal at T10-T11 with associated cord edema  Severe narrowing of the canal L1-L2 with left paracentral disc extrusion  narrowing the left subarticular zone and left lateral recess posterior to L2      Cervical impression: Post surgical and degenerative changes with multilevel spinal and  foraminal stenosis    PLAN:  PO pain medications as needed  Ambulate with assistance  Advance diabetic diet  Ice packs to back for PRN pain  VTE prophylaxis: SEDs. Cannot tolerate compression hose  Encourage use of ICS  Surgery scheduled for 17.  NPO after midnight  Hospitalist consulted for medical management and has signed off - thank you for your assistance    Pre-op ECHO and EKG obtained    TTE: Left ventricle: Systolic function was vigorous by visual assessment. Ejection fraction was estimated in the range of 65 % to 70 %. There were   no regional wall motion abnormalities. Doppler parameters were consistent   with abnormal left ventricular relaxation (grade 1 diastolic dysfunction). EKG:   Diagnosis   Final      Normal sinus rhythm   Normal ECG   When compared with ECG of 01-OCT-2013 12:43,   No significant change was found      Pre-op urinalysis, type and screen, PT/INR pending.  CMP and CBC, A1C within normal limits   Obtain consent  Follow up postoperatively      Ro Ross PA-C

## 2017-07-31 NOTE — PROGRESS NOTES
Cm continuing to follow for transitions of care, discussed patient during rounds. Patient is scheduled for surgery tomorrow, THORACOLUMBAR FUSION. Once surgery is completed and therapy is able to work with patient, cm will assist with disposition. Cm noted that patient has already voiced interest in going to [facility]. Will follow.   Advance Donny Godinez

## 2017-08-01 ENCOUNTER — ANESTHESIA (OUTPATIENT)
Dept: SURGERY | Age: 82
DRG: 460 | End: 2017-08-01
Payer: MEDICARE

## 2017-08-01 ENCOUNTER — APPOINTMENT (OUTPATIENT)
Dept: GENERAL RADIOLOGY | Age: 82
DRG: 460 | End: 2017-08-01
Attending: ORTHOPAEDIC SURGERY
Payer: MEDICARE

## 2017-08-01 ENCOUNTER — APPOINTMENT (OUTPATIENT)
Dept: GENERAL RADIOLOGY | Age: 82
DRG: 460 | End: 2017-08-01
Attending: ANESTHESIOLOGY
Payer: MEDICARE

## 2017-08-01 LAB
APTT PPP: 25.1 SEC (ref 22.1–32.5)
FIBRINOGEN PPP-MCNC: 303 MG/DL (ref 200–475)
GLUCOSE BLD STRIP.AUTO-MCNC: 119 MG/DL (ref 65–100)
GLUCOSE BLD STRIP.AUTO-MCNC: 139 MG/DL (ref 65–100)
GLUCOSE BLD STRIP.AUTO-MCNC: 155 MG/DL (ref 65–100)
INR PPP: 1.1 (ref 0.9–1.1)
PROTHROMBIN TIME: 11.4 SEC (ref 9–11.1)
SERVICE CMNT-IMP: ABNORMAL
THERAPEUTIC RANGE,PTTT: ABNORMAL SECS (ref 58–77)

## 2017-08-01 PROCEDURE — 82962 GLUCOSE BLOOD TEST: CPT

## 2017-08-01 PROCEDURE — C1713 ANCHOR/SCREW BN/BN,TIS/BN: HCPCS | Performed by: ORTHOPAEDIC SURGERY

## 2017-08-01 PROCEDURE — 77030018836 HC SOL IRR NACL ICUM -A: Performed by: ORTHOPAEDIC SURGERY

## 2017-08-01 PROCEDURE — 77030012406 HC DRN WND PENRS BARD -A: Performed by: ORTHOPAEDIC SURGERY

## 2017-08-01 PROCEDURE — 74011000258 HC RX REV CODE- 258

## 2017-08-01 PROCEDURE — 74011250637 HC RX REV CODE- 250/637: Performed by: PHYSICIAN ASSISTANT

## 2017-08-01 PROCEDURE — 74011250636 HC RX REV CODE- 250/636

## 2017-08-01 PROCEDURE — 74011250636 HC RX REV CODE- 250/636: Performed by: ORTHOPAEDIC SURGERY

## 2017-08-01 PROCEDURE — 77030029099 HC BN WAX SSPC -A: Performed by: ORTHOPAEDIC SURGERY

## 2017-08-01 PROCEDURE — 77030031139 HC SUT VCRL2 J&J -A: Performed by: ORTHOPAEDIC SURGERY

## 2017-08-01 PROCEDURE — 77030006821 HC BLD SAW SAG BRSM -A: Performed by: ORTHOPAEDIC SURGERY

## 2017-08-01 PROCEDURE — 74011250637 HC RX REV CODE- 250/637: Performed by: NURSE PRACTITIONER

## 2017-08-01 PROCEDURE — 76060000045 HC ANESTHESIA 7 TO 7.5 HR: Performed by: ORTHOPAEDIC SURGERY

## 2017-08-01 PROCEDURE — 74011636637 HC RX REV CODE- 636/637: Performed by: FAMILY MEDICINE

## 2017-08-01 PROCEDURE — 77030013797 HC KT TRNSDUC PRSSR EDWD -A

## 2017-08-01 PROCEDURE — 36415 COLL VENOUS BLD VENIPUNCTURE: CPT | Performed by: ANESTHESIOLOGY

## 2017-08-01 PROCEDURE — 77030020782 HC GWN BAIR PAWS FLX 3M -B

## 2017-08-01 PROCEDURE — 72020 X-RAY EXAM OF SPINE 1 VIEW: CPT

## 2017-08-01 PROCEDURE — 0RGA0K1 FUSION OF THORACOLUMBAR VERTEBRAL JOINT WITH NONAUTOLOGOUS TISSUE SUBSTITUTE, POSTERIOR APPROACH, POSTERIOR COLUMN, OPEN APPROACH: ICD-10-PCS | Performed by: ORTHOPAEDIC SURGERY

## 2017-08-01 PROCEDURE — 76010000181 HC OR TIME 7 TO 7.5 HR INTENSV-TIER 1: Performed by: ORTHOPAEDIC SURGERY

## 2017-08-01 PROCEDURE — 74011000250 HC RX REV CODE- 250: Performed by: ORTHOPAEDIC SURGERY

## 2017-08-01 PROCEDURE — 71010 XR CHEST PORT: CPT

## 2017-08-01 PROCEDURE — 85610 PROTHROMBIN TIME: CPT | Performed by: ANESTHESIOLOGY

## 2017-08-01 PROCEDURE — 77030020061 HC IV BLD WRMR ADMIN SET 3M -B: Performed by: ANESTHESIOLOGY

## 2017-08-01 PROCEDURE — 74011000250 HC RX REV CODE- 250: Performed by: ANESTHESIOLOGY

## 2017-08-01 PROCEDURE — 74011000250 HC RX REV CODE- 250

## 2017-08-01 PROCEDURE — 74011250636 HC RX REV CODE- 250/636: Performed by: PHYSICIAN ASSISTANT

## 2017-08-01 PROCEDURE — 74011250637 HC RX REV CODE- 250/637: Performed by: HOSPITALIST

## 2017-08-01 PROCEDURE — P9045 ALBUMIN (HUMAN), 5%, 250 ML: HCPCS

## 2017-08-01 PROCEDURE — 77030013079 HC BLNKT BAIR HGGR 3M -A: Performed by: ANESTHESIOLOGY

## 2017-08-01 PROCEDURE — 77030032490 HC SLV COMPR SCD KNE COVD -B: Performed by: ORTHOPAEDIC SURGERY

## 2017-08-01 PROCEDURE — 77030026438 HC STYL ET INTUB CARD -A: Performed by: ANESTHESIOLOGY

## 2017-08-01 PROCEDURE — 77030018719 HC DRSG PTCH ANTIMIC J&J -A

## 2017-08-01 PROCEDURE — 77030034850: Performed by: ORTHOPAEDIC SURGERY

## 2017-08-01 PROCEDURE — 0SB20ZZ EXCISION OF LUMBAR VERTEBRAL DISC, OPEN APPROACH: ICD-10-PCS | Performed by: ORTHOPAEDIC SURGERY

## 2017-08-01 PROCEDURE — 77030008684 HC TU ET CUF COVD -B: Performed by: ANESTHESIOLOGY

## 2017-08-01 PROCEDURE — 77030034475 HC MISC IMPL SPN: Performed by: ORTHOPAEDIC SURGERY

## 2017-08-01 PROCEDURE — 74011000272 HC RX REV CODE- 272: Performed by: ORTHOPAEDIC SURGERY

## 2017-08-01 PROCEDURE — 77030004391 HC BUR FLUT MEDT -C: Performed by: ORTHOPAEDIC SURGERY

## 2017-08-01 PROCEDURE — 77030002996 HC SUT SLK J&J -A

## 2017-08-01 PROCEDURE — 65660000000 HC RM CCU STEPDOWN

## 2017-08-01 PROCEDURE — 76210000001 HC OR PH I REC 2.5 TO 3 HR: Performed by: ORTHOPAEDIC SURGERY

## 2017-08-01 PROCEDURE — 77030014647 HC SEAL FBRN TISSL BAXT -D: Performed by: ORTHOPAEDIC SURGERY

## 2017-08-01 PROCEDURE — 74011250636 HC RX REV CODE- 250/636: Performed by: ANESTHESIOLOGY

## 2017-08-01 PROCEDURE — 77030005401 HC CATH RAD ARRO -A

## 2017-08-01 PROCEDURE — 77030005402 HC CATH RAD ART LN KT TELE -B

## 2017-08-01 DEVICE — GRAFT BNE SUB 20CC 2 4MM GRAN GROWTH FACT ALLGRFT OSTEOAMP: Type: IMPLANTABLE DEVICE | Site: BACK | Status: FUNCTIONAL

## 2017-08-01 DEVICE — CREO&REG; 5.5, 6.5 X 45MM POLYAXIAL SCREW
Type: IMPLANTABLE DEVICE | Site: BACK | Status: FUNCTIONAL
Brand: CREO

## 2017-08-01 DEVICE — 5.5 LOCKING CAP, CREO
Type: IMPLANTABLE DEVICE | Site: BACK | Status: FUNCTIONAL
Brand: CREO

## 2017-08-01 DEVICE — GRAFT BNE SUB 10ML ALLGRFT PTTY OSTEOAMP: Type: IMPLANTABLE DEVICE | Site: BACK | Status: FUNCTIONAL

## 2017-08-01 RX ORDER — SODIUM CHLORIDE 0.9 % (FLUSH) 0.9 %
5-10 SYRINGE (ML) INJECTION EVERY 8 HOURS
Status: DISCONTINUED | OUTPATIENT
Start: 2017-08-02 | End: 2017-08-04 | Stop reason: HOSPADM

## 2017-08-01 RX ORDER — TETRACAINE HYDROCHLORIDE 5 MG/ML
2 SOLUTION OPHTHALMIC
Status: COMPLETED | OUTPATIENT
Start: 2017-08-01 | End: 2017-08-01

## 2017-08-01 RX ORDER — MIDAZOLAM HYDROCHLORIDE 1 MG/ML
1 INJECTION, SOLUTION INTRAMUSCULAR; INTRAVENOUS AS NEEDED
Status: DISCONTINUED | OUTPATIENT
Start: 2017-08-01 | End: 2017-08-01 | Stop reason: HOSPADM

## 2017-08-01 RX ORDER — LIDOCAINE HYDROCHLORIDE ANHYDROUS AND DEXTROSE MONOHYDRATE .8; 5 G/100ML; G/100ML
INJECTION, SOLUTION INTRAVENOUS
Status: DISCONTINUED | OUTPATIENT
Start: 2017-08-01 | End: 2017-08-01 | Stop reason: HOSPADM

## 2017-08-01 RX ORDER — FENTANYL CITRATE 50 UG/ML
50 INJECTION, SOLUTION INTRAMUSCULAR; INTRAVENOUS AS NEEDED
Status: DISCONTINUED | OUTPATIENT
Start: 2017-08-01 | End: 2017-08-01 | Stop reason: HOSPADM

## 2017-08-01 RX ORDER — SODIUM CHLORIDE 0.9 % (FLUSH) 0.9 %
5-10 SYRINGE (ML) INJECTION EVERY 8 HOURS
Status: DISCONTINUED | OUTPATIENT
Start: 2017-08-01 | End: 2017-08-01 | Stop reason: HOSPADM

## 2017-08-01 RX ORDER — SODIUM CHLORIDE 0.9 % (FLUSH) 0.9 %
5-10 SYRINGE (ML) INJECTION AS NEEDED
Status: DISCONTINUED | OUTPATIENT
Start: 2017-08-01 | End: 2017-08-01 | Stop reason: HOSPADM

## 2017-08-01 RX ORDER — LIDOCAINE HYDROCHLORIDE 20 MG/ML
INJECTION, SOLUTION EPIDURAL; INFILTRATION; INTRACAUDAL; PERINEURAL AS NEEDED
Status: DISCONTINUED | OUTPATIENT
Start: 2017-08-01 | End: 2017-08-01 | Stop reason: HOSPADM

## 2017-08-01 RX ORDER — DIPHENHYDRAMINE HYDROCHLORIDE 50 MG/ML
12.5 INJECTION, SOLUTION INTRAMUSCULAR; INTRAVENOUS
Status: DISCONTINUED | OUTPATIENT
Start: 2017-08-01 | End: 2017-08-04 | Stop reason: HOSPADM

## 2017-08-01 RX ORDER — ONDANSETRON 2 MG/ML
4 INJECTION INTRAMUSCULAR; INTRAVENOUS
Status: DISCONTINUED | OUTPATIENT
Start: 2017-08-01 | End: 2017-08-04 | Stop reason: HOSPADM

## 2017-08-01 RX ORDER — FACIAL-BODY WIPES
10 EACH TOPICAL DAILY PRN
Status: DISCONTINUED | OUTPATIENT
Start: 2017-08-03 | End: 2017-08-04 | Stop reason: HOSPADM

## 2017-08-01 RX ORDER — PROPOFOL 10 MG/ML
INJECTION, EMULSION INTRAVENOUS AS NEEDED
Status: DISCONTINUED | OUTPATIENT
Start: 2017-08-01 | End: 2017-08-01 | Stop reason: HOSPADM

## 2017-08-01 RX ORDER — SODIUM CHLORIDE 9 MG/ML
25 INJECTION, SOLUTION INTRAVENOUS CONTINUOUS
Status: DISCONTINUED | OUTPATIENT
Start: 2017-08-01 | End: 2017-08-01 | Stop reason: HOSPADM

## 2017-08-01 RX ORDER — ALBUMIN HUMAN 50 G/1000ML
SOLUTION INTRAVENOUS AS NEEDED
Status: DISCONTINUED | OUTPATIENT
Start: 2017-08-01 | End: 2017-08-01 | Stop reason: HOSPADM

## 2017-08-01 RX ORDER — SODIUM CHLORIDE 9 MG/ML
1000 INJECTION, SOLUTION INTRAVENOUS CONTINUOUS
Status: DISCONTINUED | OUTPATIENT
Start: 2017-08-01 | End: 2017-08-01 | Stop reason: HOSPADM

## 2017-08-01 RX ORDER — ACETAMINOPHEN 10 MG/ML
1000 INJECTION, SOLUTION INTRAVENOUS ONCE
Status: COMPLETED | OUTPATIENT
Start: 2017-08-01 | End: 2017-08-01

## 2017-08-01 RX ORDER — OXYCODONE HYDROCHLORIDE 5 MG/1
10 TABLET ORAL
Status: DISCONTINUED | OUTPATIENT
Start: 2017-08-01 | End: 2017-08-04 | Stop reason: HOSPADM

## 2017-08-01 RX ORDER — SODIUM CHLORIDE, SODIUM LACTATE, POTASSIUM CHLORIDE, CALCIUM CHLORIDE 600; 310; 30; 20 MG/100ML; MG/100ML; MG/100ML; MG/100ML
25 INJECTION, SOLUTION INTRAVENOUS CONTINUOUS
Status: DISCONTINUED | OUTPATIENT
Start: 2017-08-01 | End: 2017-08-01 | Stop reason: HOSPADM

## 2017-08-01 RX ORDER — SODIUM CHLORIDE 9 MG/ML
INJECTION, SOLUTION INTRAVENOUS
Status: DISCONTINUED | OUTPATIENT
Start: 2017-08-01 | End: 2017-08-01 | Stop reason: HOSPADM

## 2017-08-01 RX ORDER — CEFAZOLIN SODIUM IN 0.9 % NACL 2 G/100 ML
PLASTIC BAG, INJECTION (ML) INTRAVENOUS AS NEEDED
Status: DISCONTINUED | OUTPATIENT
Start: 2017-08-01 | End: 2017-08-01 | Stop reason: HOSPADM

## 2017-08-01 RX ORDER — FENTANYL CITRATE 50 UG/ML
25 INJECTION, SOLUTION INTRAMUSCULAR; INTRAVENOUS
Status: COMPLETED | OUTPATIENT
Start: 2017-08-01 | End: 2017-08-01

## 2017-08-01 RX ORDER — PROPOFOL 10 MG/ML
INJECTION, EMULSION INTRAVENOUS
Status: DISCONTINUED | OUTPATIENT
Start: 2017-08-01 | End: 2017-08-01 | Stop reason: HOSPADM

## 2017-08-01 RX ORDER — DEXTROSE, SODIUM CHLORIDE, SODIUM LACTATE, POTASSIUM CHLORIDE, AND CALCIUM CHLORIDE 5; .6; .31; .03; .02 G/100ML; G/100ML; G/100ML; G/100ML; G/100ML
25 INJECTION, SOLUTION INTRAVENOUS CONTINUOUS
Status: DISCONTINUED | OUTPATIENT
Start: 2017-08-01 | End: 2017-08-01 | Stop reason: HOSPADM

## 2017-08-01 RX ORDER — HYDROMORPHONE HCL IN 0.9% NACL 15 MG/30ML
PATIENT CONTROLLED ANALGESIA VIAL INTRAVENOUS
Status: DISCONTINUED | OUTPATIENT
Start: 2017-08-01 | End: 2017-08-02

## 2017-08-01 RX ORDER — HYDROMORPHONE HYDROCHLORIDE 1 MG/ML
INJECTION, SOLUTION INTRAMUSCULAR; INTRAVENOUS; SUBCUTANEOUS AS NEEDED
Status: DISCONTINUED | OUTPATIENT
Start: 2017-08-01 | End: 2017-08-01 | Stop reason: HOSPADM

## 2017-08-01 RX ORDER — FENTANYL CITRATE 50 UG/ML
INJECTION, SOLUTION INTRAMUSCULAR; INTRAVENOUS AS NEEDED
Status: DISCONTINUED | OUTPATIENT
Start: 2017-08-01 | End: 2017-08-01 | Stop reason: HOSPADM

## 2017-08-01 RX ORDER — MORPHINE SULFATE 10 MG/ML
2 INJECTION, SOLUTION INTRAMUSCULAR; INTRAVENOUS
Status: DISCONTINUED | OUTPATIENT
Start: 2017-08-01 | End: 2017-08-01 | Stop reason: HOSPADM

## 2017-08-01 RX ORDER — KETAMINE HYDROCHLORIDE 10 MG/ML
INJECTION, SOLUTION INTRAMUSCULAR; INTRAVENOUS AS NEEDED
Status: DISCONTINUED | OUTPATIENT
Start: 2017-08-01 | End: 2017-08-01 | Stop reason: HOSPADM

## 2017-08-01 RX ORDER — VANCOMYCIN HYDROCHLORIDE 1 G/20ML
INJECTION, POWDER, LYOPHILIZED, FOR SOLUTION INTRAVENOUS AS NEEDED
Status: DISCONTINUED | OUTPATIENT
Start: 2017-08-01 | End: 2017-08-01 | Stop reason: HOSPADM

## 2017-08-01 RX ORDER — ONDANSETRON 2 MG/ML
INJECTION INTRAMUSCULAR; INTRAVENOUS AS NEEDED
Status: DISCONTINUED | OUTPATIENT
Start: 2017-08-01 | End: 2017-08-01 | Stop reason: HOSPADM

## 2017-08-01 RX ORDER — LIDOCAINE HYDROCHLORIDE 10 MG/ML
0.1 INJECTION, SOLUTION EPIDURAL; INFILTRATION; INTRACAUDAL; PERINEURAL AS NEEDED
Status: DISCONTINUED | OUTPATIENT
Start: 2017-08-01 | End: 2017-08-01 | Stop reason: HOSPADM

## 2017-08-01 RX ORDER — HYDROMORPHONE HYDROCHLORIDE 1 MG/ML
0.2 INJECTION, SOLUTION INTRAMUSCULAR; INTRAVENOUS; SUBCUTANEOUS
Status: DISCONTINUED | OUTPATIENT
Start: 2017-08-01 | End: 2017-08-01 | Stop reason: HOSPADM

## 2017-08-01 RX ORDER — CEFAZOLIN SODIUM IN 0.9 % NACL 2 G/50 ML
2 INTRAVENOUS SOLUTION, PIGGYBACK (ML) INTRAVENOUS EVERY 8 HOURS
Status: COMPLETED | OUTPATIENT
Start: 2017-08-01 | End: 2017-08-02

## 2017-08-01 RX ORDER — OXYCODONE HYDROCHLORIDE 5 MG/1
5 TABLET ORAL
Status: DISCONTINUED | OUTPATIENT
Start: 2017-08-01 | End: 2017-08-04 | Stop reason: HOSPADM

## 2017-08-01 RX ORDER — SUCCINYLCHOLINE CHLORIDE 20 MG/ML
INJECTION INTRAMUSCULAR; INTRAVENOUS AS NEEDED
Status: DISCONTINUED | OUTPATIENT
Start: 2017-08-01 | End: 2017-08-01 | Stop reason: HOSPADM

## 2017-08-01 RX ORDER — ACETAMINOPHEN 325 MG/1
650 TABLET ORAL EVERY 6 HOURS
Status: DISCONTINUED | OUTPATIENT
Start: 2017-08-01 | End: 2017-08-04 | Stop reason: HOSPADM

## 2017-08-01 RX ORDER — DIPHENHYDRAMINE HYDROCHLORIDE 50 MG/ML
12.5 INJECTION, SOLUTION INTRAMUSCULAR; INTRAVENOUS AS NEEDED
Status: DISCONTINUED | OUTPATIENT
Start: 2017-08-01 | End: 2017-08-01 | Stop reason: HOSPADM

## 2017-08-01 RX ORDER — ONDANSETRON 2 MG/ML
4 INJECTION INTRAMUSCULAR; INTRAVENOUS AS NEEDED
Status: DISCONTINUED | OUTPATIENT
Start: 2017-08-01 | End: 2017-08-01 | Stop reason: HOSPADM

## 2017-08-01 RX ORDER — SODIUM CHLORIDE 0.9 % (FLUSH) 0.9 %
5-10 SYRINGE (ML) INJECTION EVERY 8 HOURS
Status: DISCONTINUED | OUTPATIENT
Start: 2017-08-01 | End: 2017-08-04 | Stop reason: HOSPADM

## 2017-08-01 RX ORDER — AMOXICILLIN 250 MG
1 CAPSULE ORAL 2 TIMES DAILY
Status: DISCONTINUED | OUTPATIENT
Start: 2017-08-01 | End: 2017-08-04 | Stop reason: HOSPADM

## 2017-08-01 RX ORDER — SODIUM CHLORIDE 0.9 % (FLUSH) 0.9 %
5-10 SYRINGE (ML) INJECTION AS NEEDED
Status: DISCONTINUED | OUTPATIENT
Start: 2017-08-01 | End: 2017-08-04 | Stop reason: HOSPADM

## 2017-08-01 RX ORDER — MIDAZOLAM HYDROCHLORIDE 1 MG/ML
0.5 INJECTION, SOLUTION INTRAMUSCULAR; INTRAVENOUS
Status: DISCONTINUED | OUTPATIENT
Start: 2017-08-01 | End: 2017-08-01 | Stop reason: HOSPADM

## 2017-08-01 RX ORDER — SODIUM CHLORIDE, SODIUM LACTATE, POTASSIUM CHLORIDE, CALCIUM CHLORIDE 600; 310; 30; 20 MG/100ML; MG/100ML; MG/100ML; MG/100ML
INJECTION, SOLUTION INTRAVENOUS
Status: DISCONTINUED | OUTPATIENT
Start: 2017-08-01 | End: 2017-08-01 | Stop reason: HOSPADM

## 2017-08-01 RX ORDER — ROCURONIUM BROMIDE 10 MG/ML
INJECTION, SOLUTION INTRAVENOUS AS NEEDED
Status: DISCONTINUED | OUTPATIENT
Start: 2017-08-01 | End: 2017-08-01 | Stop reason: HOSPADM

## 2017-08-01 RX ORDER — NALOXONE HYDROCHLORIDE 0.4 MG/ML
0.4 INJECTION, SOLUTION INTRAMUSCULAR; INTRAVENOUS; SUBCUTANEOUS AS NEEDED
Status: DISCONTINUED | OUTPATIENT
Start: 2017-08-01 | End: 2017-08-04 | Stop reason: HOSPADM

## 2017-08-01 RX ORDER — ACETAMINOPHEN 10 MG/ML
INJECTION, SOLUTION INTRAVENOUS
Status: COMPLETED
Start: 2017-08-01 | End: 2017-08-01

## 2017-08-01 RX ORDER — SODIUM CHLORIDE 9 MG/ML
125 INJECTION, SOLUTION INTRAVENOUS CONTINUOUS
Status: DISPENSED | OUTPATIENT
Start: 2017-08-01 | End: 2017-08-02

## 2017-08-01 RX ADMIN — SODIUM CHLORIDE 125 ML/HR: 900 INJECTION, SOLUTION INTRAVENOUS at 18:14

## 2017-08-01 RX ADMIN — MORPHINE SULFATE 2 MG: 10 INJECTION, SOLUTION INTRAMUSCULAR; INTRAVENOUS at 16:53

## 2017-08-01 RX ADMIN — ACETAMINOPHEN 650 MG: 325 TABLET, FILM COATED ORAL at 21:16

## 2017-08-01 RX ADMIN — HYDROMORPHONE HYDROCHLORIDE 0.12 MG: 1 INJECTION, SOLUTION INTRAMUSCULAR; INTRAVENOUS; SUBCUTANEOUS at 14:24

## 2017-08-01 RX ADMIN — SODIUM CHLORIDE, SODIUM LACTATE, POTASSIUM CHLORIDE, CALCIUM CHLORIDE: 600; 310; 30; 20 INJECTION, SOLUTION INTRAVENOUS at 08:52

## 2017-08-01 RX ADMIN — FENTANYL CITRATE 25 MCG: 50 INJECTION, SOLUTION INTRAMUSCULAR; INTRAVENOUS at 16:00

## 2017-08-01 RX ADMIN — Medication 10 ML: at 15:26

## 2017-08-01 RX ADMIN — HYDROMORPHONE HYDROCHLORIDE 0.12 MG: 1 INJECTION, SOLUTION INTRAMUSCULAR; INTRAVENOUS; SUBCUTANEOUS at 14:44

## 2017-08-01 RX ADMIN — Medication 10 ML: at 21:10

## 2017-08-01 RX ADMIN — FENTANYL CITRATE 100 MCG: 50 INJECTION, SOLUTION INTRAMUSCULAR; INTRAVENOUS at 07:31

## 2017-08-01 RX ADMIN — TETRACAINE HYDROCHLORIDE 2 DROP: 5 SOLUTION OPHTHALMIC at 20:36

## 2017-08-01 RX ADMIN — ALBUMIN HUMAN 250 ML: 50 SOLUTION INTRAVENOUS at 09:23

## 2017-08-01 RX ADMIN — FENTANYL CITRATE 25 MCG: 50 INJECTION, SOLUTION INTRAMUSCULAR; INTRAVENOUS at 15:45

## 2017-08-01 RX ADMIN — ROCURONIUM BROMIDE 10 MG: 10 INJECTION, SOLUTION INTRAVENOUS at 08:38

## 2017-08-01 RX ADMIN — ALBUMIN HUMAN 250 ML: 50 SOLUTION INTRAVENOUS at 10:55

## 2017-08-01 RX ADMIN — KETAMINE HYDROCHLORIDE 20 MG: 10 INJECTION, SOLUTION INTRAMUSCULAR; INTRAVENOUS at 07:53

## 2017-08-01 RX ADMIN — ACETAMINOPHEN 1000 MG: 10 INJECTION, SOLUTION INTRAVENOUS at 16:37

## 2017-08-01 RX ADMIN — PROPOFOL 100 MCG/KG/MIN: 10 INJECTION, EMULSION INTRAVENOUS at 07:55

## 2017-08-01 RX ADMIN — ROCURONIUM BROMIDE 5 MG: 10 INJECTION, SOLUTION INTRAVENOUS at 07:53

## 2017-08-01 RX ADMIN — SODIUM CHLORIDE, SODIUM LACTATE, POTASSIUM CHLORIDE, CALCIUM CHLORIDE: 600; 310; 30; 20 INJECTION, SOLUTION INTRAVENOUS at 07:47

## 2017-08-01 RX ADMIN — PROPOFOL 20 MG: 10 INJECTION, EMULSION INTRAVENOUS at 09:06

## 2017-08-01 RX ADMIN — MORPHINE SULFATE 2 MG: 10 INJECTION, SOLUTION INTRAMUSCULAR; INTRAVENOUS at 16:45

## 2017-08-01 RX ADMIN — Medication 10 ML: at 15:24

## 2017-08-01 RX ADMIN — MORPHINE SULFATE 2 MG: 10 INJECTION, SOLUTION INTRAMUSCULAR; INTRAVENOUS at 17:15

## 2017-08-01 RX ADMIN — PROPOFOL 30 MG: 10 INJECTION, EMULSION INTRAVENOUS at 13:06

## 2017-08-01 RX ADMIN — FENTANYL CITRATE 12.5 MCG: 50 INJECTION, SOLUTION INTRAMUSCULAR; INTRAVENOUS at 09:10

## 2017-08-01 RX ADMIN — MORPHINE SULFATE 2 MG: 10 INJECTION, SOLUTION INTRAMUSCULAR; INTRAVENOUS at 17:03

## 2017-08-01 RX ADMIN — METOPROLOL TARTRATE 12.5 MG: 25 TABLET ORAL at 21:09

## 2017-08-01 RX ADMIN — FENTANYL CITRATE 50 MCG: 50 INJECTION, SOLUTION INTRAMUSCULAR; INTRAVENOUS at 08:24

## 2017-08-01 RX ADMIN — CEFAZOLIN 2 G: 1 INJECTION, POWDER, FOR SOLUTION INTRAMUSCULAR; INTRAVENOUS; PARENTERAL at 20:47

## 2017-08-01 RX ADMIN — LIDOCAINE HYDROCHLORIDE ANHYDROUS AND DEXTROSE MONOHYDRATE 2 MG/KG/HR: .8; 5 INJECTION, SOLUTION INTRAVENOUS at 07:55

## 2017-08-01 RX ADMIN — ONDANSETRON 4 MG: 2 INJECTION INTRAMUSCULAR; INTRAVENOUS at 20:47

## 2017-08-01 RX ADMIN — HYDROMORPHONE HYDROCHLORIDE 0.12 MG: 1 INJECTION, SOLUTION INTRAMUSCULAR; INTRAVENOUS; SUBCUTANEOUS at 13:52

## 2017-08-01 RX ADMIN — Medication 2 G: at 12:05

## 2017-08-01 RX ADMIN — LIDOCAINE HYDROCHLORIDE 100 MG: 20 INJECTION, SOLUTION EPIDURAL; INFILTRATION; INTRACAUDAL; PERINEURAL at 07:53

## 2017-08-01 RX ADMIN — FENTANYL CITRATE 25 MCG: 50 INJECTION, SOLUTION INTRAMUSCULAR; INTRAVENOUS at 15:55

## 2017-08-01 RX ADMIN — ALBUMIN HUMAN 250 ML: 50 SOLUTION INTRAVENOUS at 12:27

## 2017-08-01 RX ADMIN — DOCUSATE SODIUM AND SENNOSIDES 1 TABLET: 8.6; 5 TABLET, FILM COATED ORAL at 21:10

## 2017-08-01 RX ADMIN — ONDANSETRON 4 MG: 2 INJECTION INTRAMUSCULAR; INTRAVENOUS at 13:35

## 2017-08-01 RX ADMIN — KETAMINE HYDROCHLORIDE 10 MG: 10 INJECTION, SOLUTION INTRAMUSCULAR; INTRAVENOUS at 08:21

## 2017-08-01 RX ADMIN — Medication: at 15:17

## 2017-08-01 RX ADMIN — MORPHINE SULFATE 2 MG: 10 INJECTION, SOLUTION INTRAMUSCULAR; INTRAVENOUS at 17:08

## 2017-08-01 RX ADMIN — ROCURONIUM BROMIDE 20 MG: 10 INJECTION, SOLUTION INTRAVENOUS at 08:17

## 2017-08-01 RX ADMIN — SODIUM CHLORIDE: 9 INJECTION, SOLUTION INTRAVENOUS at 11:30

## 2017-08-01 RX ADMIN — MIDAZOLAM HYDROCHLORIDE 2 MG: 1 INJECTION, SOLUTION INTRAMUSCULAR; INTRAVENOUS at 07:32

## 2017-08-01 RX ADMIN — HYDROMORPHONE HYDROCHLORIDE 0.25 MG: 1 INJECTION, SOLUTION INTRAMUSCULAR; INTRAVENOUS; SUBCUTANEOUS at 14:00

## 2017-08-01 RX ADMIN — INSULIN LISPRO 2 UNITS: 100 INJECTION, SOLUTION INTRAVENOUS; SUBCUTANEOUS at 15:47

## 2017-08-01 RX ADMIN — FENTANYL CITRATE 25 MCG: 50 INJECTION, SOLUTION INTRAMUSCULAR; INTRAVENOUS at 15:38

## 2017-08-01 RX ADMIN — Medication 10 ML: at 15:25

## 2017-08-01 RX ADMIN — KETAMINE HYDROCHLORIDE 10 MG: 10 INJECTION, SOLUTION INTRAMUSCULAR; INTRAVENOUS at 09:31

## 2017-08-01 RX ADMIN — PROPOFOL 150 MG: 10 INJECTION, EMULSION INTRAVENOUS at 07:53

## 2017-08-01 RX ADMIN — GABAPENTIN 100 MG: 100 CAPSULE ORAL at 21:16

## 2017-08-01 RX ADMIN — SUCCINYLCHOLINE CHLORIDE 120 MG: 20 INJECTION INTRAMUSCULAR; INTRAVENOUS at 07:53

## 2017-08-01 RX ADMIN — ROCURONIUM BROMIDE 10 MG: 10 INJECTION, SOLUTION INTRAVENOUS at 09:03

## 2017-08-01 RX ADMIN — Medication 2 G: at 08:00

## 2017-08-01 RX ADMIN — HYDROMORPHONE HYDROCHLORIDE 0.38 MG: 1 INJECTION, SOLUTION INTRAMUSCULAR; INTRAVENOUS; SUBCUTANEOUS at 13:29

## 2017-08-01 RX ADMIN — SODIUM CHLORIDE, SODIUM LACTATE, POTASSIUM CHLORIDE, CALCIUM CHLORIDE: 600; 310; 30; 20 INJECTION, SOLUTION INTRAVENOUS at 14:02

## 2017-08-01 NOTE — PERIOP NOTES
TRANSFER - OUT REPORT:    Verbal report given to Shayy ALLEN(name) on Kavon Barrett  being transferred to [facility](unit) for routine post - op       Report consisted of patients Situation, Background, Assessment and   Recommendations(SBAR). Time Pre op antibiotic given:0800 1205  Anesthesia Stop time: 4770  Valladares Present on Transfer to floor:Y  Order for Valladares on Chart:Y    Information from the following report(s) SBAR, OR Summary, Intake/Output, MAR, Recent Results and Med Rec Status was reviewed with the receiving nurse. Opportunity for questions and clarification was provided. Is the patient on 02? YES       L/Min 2       Other     Is the patient on a monitor? YES    Is the nurse transporting with the patient? YES    Surgical Waiting Area notified of patient's transfer from PACU? YES      The following personal items collected during your admission accompanied patient upon transfer:   Dental Appliance: Dental Appliances: None  Vision: Visual Aid: Glasses  Hearing Aid:    Jewelry: Jewelry: Ring (wedding band)  Clothing: Clothing:  (pt from room)  Other Valuables:  Other Valuables: Cell Phone, Eyeglasses, Other (comment) (dentures)  Valuables sent to safe:

## 2017-08-01 NOTE — PROGRESS NOTES
Bedside shift change report given to [facility] (oncoming nurse) by Cathi Yu RN (offgoing nurse). Report included the following information SBAR, Intake/Output, Recent Results, Cardiac Rhythm NSR and Alarm Parameters .

## 2017-08-01 NOTE — PROGRESS NOTES
Bedside and Verbal shift change report given to [facility]. Report included the following information SBAR.

## 2017-08-01 NOTE — ANESTHESIA PREPROCEDURE EVALUATION
Anesthetic History   No history of anesthetic complications            Review of Systems / Medical History  Patient summary reviewed, nursing notes reviewed and pertinent labs reviewed    Pulmonary  Within defined limits                 Neuro/Psych   Within defined limits           Cardiovascular    Hypertension                Comments: Echo ef 65-70%  Aortic Valve fxn nl   GI/Hepatic/Renal         Renal disease: CRI       Endo/Other    Diabetes: type 2    Anemia     Other Findings            Physical Exam    Airway  Mallampati: II  TM Distance: > 6 cm  Neck ROM: normal range of motion   Mouth opening: Normal     Cardiovascular  Regular rate and rhythm,  S1 and S2 normal,  no murmur, click, rub, or gallop             Dental  No notable dental hx       Pulmonary  Breath sounds clear to auscultation               Abdominal  GI exam deferred       Other Findings            Anesthetic Plan    ASA: 3  Anesthesia type: general    Monitoring Plan: Arterial line and CVP      Induction: Intravenous  Anesthetic plan and risks discussed with: Patient

## 2017-08-01 NOTE — PERIOP NOTES
Dr. Winifred Whitten aware pt still not A/O x4, oriented to person, birthday, name of surgeon, complaining of pain, orders for tylenol received. 1700: Dr. Winifred Whitten at bedside, pt states he feels there is something in his R eye, orders for drops place, eye patch covering eye. Pt states he is having pain but didn't realize his surgery was over.

## 2017-08-01 NOTE — PROGRESS NOTES
Charge nurse Sujatha Cardenas RN and Michelle Ryan RN performed a dual skin assessment on this patient--Impairments noted on DocFlowsheet. Primary RN, Shayy, informed of patient's skin. Patient has hemovac to lower back, back incision covered, and is clean, dry, and intact.   Kushal score is 20

## 2017-08-01 NOTE — PROGRESS NOTES
Ctsp after surgery with complaint of foreign body sensation in Right eyes. Exam shows Right eye to be tearing red. Will treat by taping eye for 24 hours. (tetracaine eye drop time one treatment.) if pain persist after 24 hours recommend ophthalmology consult.

## 2017-08-01 NOTE — OP NOTES
[address] [facility] [place] [address], [address]   OP NOTE       Name:  Pattie García   MR#:  [id number]   :  1935   Account #:  [de-identified]    Surgery Date:     Date of Adm:  2017       PREOPERATIVE DIAGNOSIS: Severe spinal stenosis, T8-S1. POSTOPERATIVE DIAGNOSIS: Severe spinal stenosis, T8-S1. PROCEDURES PERFORMED:     1. Thoracolumbar laminectomy, T8-S1.  2. Partial laminectomy of T7.   3. Discectomy, left side, L1-L2.   4. Posterior spinal fusion, T6-L1 using Globus instrumentation,   supplemented with cancellous allograft. SURGEON: JOHNSON Campos MD    ASSISTANT: Raman Thomson PA-C    ESTIMATED BLOOD LOSS: 1200cc    SPECIMENS REMOVED: None    ANESTHESIA:  General.     INDICATIONS: This is an 27-year-old gentleman who is well known to   our practice. I had done an anterior cervical fusion on him some time   last year, C3-C4, which he did well without any particular repercussion. He did have some cord compression at that time. I felt that he would   clearly improve neurologically. He presented just last week with acute   onset of debilitating pain in his lower extremities. He was found to be   quite weak in his lower extremities and on followup imaging   demonstrated to have severe canal stenosis in the thoracic spine at 3   different levels as well as diffuse severe arthritic changes in the lumbar   spine, producing varying degrees of spinal stenosis from L1-S1. Given   the extent of these complaints of discomfort, but as well the degree of   weakness, a thoracolumbar decompression and fusion offered. Potential benefits and complications reviewed. DESCRIPTION OF PROCEDURE: The patient was brought to the   operating room in the supine position. General anesthetic   administered. The patient placed in the prone position on gel rolls,   secured to the operating table.  Intraoperative spinal cord monitoring   used throughout the operative procedure without change in signal.   Preoperative antibiotics administered. Thigh-high FUENTES hose and   sequential pumps applied to the patient's lower extremity. An incision was made extending from about T5 down to the sacrum. Subcutaneous tissues then divided in line with the incision down to the   level of the fascia. The fascia incised in the midline and subperiosteal   dissection completed over the spinous process and laminar surfaces. This was completed bilaterally. An x-ray was eventually taken to verify   the level. A complete laminectomy was done from T8 down to S1. I did   a partial laminectomy of T7. [id number] decompression ensued   with a partial medial facetectomy at each level and wide   foraminotomies over the distal nerve roots in the lumbar region. At the   end of the decompression, which did take several hours, I felt   comfortable that the central canal was decompressed. I did palpate the   disc material at T8-T9. I did actually remove a fair amount of disc   material at that level. Similarly, L1-2 on the left side, I removed a lot of   disc material in the posterolateral zone at the central zone extent. Certainly, this was not easy to do, but using the Mariia curettes, I was   able to push material down and extract it with a pituitary. FloSeal used   for the purposes of hemostasis. I then placed drill holes in the pedicles of T6 down to L1. Pedicles were   probed in a routine fashion. Pins were placed, pedicles marked and x-  ray was taken to verify position and direction. Cancellous allograft 60   mL was utilized and packed over decorticated surfaces to include the   facet joint at each level in the transverse process of the thoracic spine   down to L1. Screws placed measuring 6.5 mm in diameter at all levels,   measuring 40-45 mm in length, with good purchase.  These were   connected with the rods that were appropriately contoured, secured using the   locking cap and final  device. Final x-ray taken showing good   position of the instrumentation. The wound had been irrigated earlier. A drain was placed. The fascia   closed using #1 Vicryl. The subcutaneous tissues and skin closed   using 2-0 and 3-0 Vicryl. The patient awoken from the anesthetic,   extubated and taken to recovery in stable condition.         MD AVIS Portillo / [place]   D:  08/01/2017   13:41   T:  08/01/2017   14:13   Job #:  [id number]

## 2017-08-02 LAB
ALBUMIN SERPL BCP-MCNC: 2.4 G/DL (ref 3.5–5)
ALBUMIN/GLOB SERPL: 0.9 {RATIO} (ref 1.1–2.2)
ALP SERPL-CCNC: 53 U/L (ref 45–117)
ALT SERPL-CCNC: 13 U/L (ref 12–78)
ANION GAP BLD CALC-SCNC: 4 MMOL/L (ref 5–15)
ANION GAP BLD CALC-SCNC: 7 MMOL/L (ref 5–15)
AST SERPL W P-5'-P-CCNC: 23 U/L (ref 15–37)
BASOPHILS # BLD AUTO: 0 K/UL (ref 0–0.1)
BASOPHILS # BLD: 0 % (ref 0–1)
BILIRUB SERPL-MCNC: 0.5 MG/DL (ref 0.2–1)
BUN SERPL-MCNC: 31 MG/DL (ref 6–20)
BUN SERPL-MCNC: 32 MG/DL (ref 6–20)
BUN/CREAT SERPL: 22 (ref 12–20)
BUN/CREAT SERPL: 24 (ref 12–20)
CALCIUM SERPL-MCNC: 7.3 MG/DL (ref 8.5–10.1)
CALCIUM SERPL-MCNC: 7.6 MG/DL (ref 8.5–10.1)
CHLORIDE SERPL-SCNC: 104 MMOL/L (ref 97–108)
CHLORIDE SERPL-SCNC: 107 MMOL/L (ref 97–108)
CO2 SERPL-SCNC: 26 MMOL/L (ref 21–32)
CO2 SERPL-SCNC: 26 MMOL/L (ref 21–32)
CREAT SERPL-MCNC: 1.36 MG/DL (ref 0.7–1.3)
CREAT SERPL-MCNC: 1.39 MG/DL (ref 0.7–1.3)
EOSINOPHIL # BLD: 0.2 K/UL (ref 0–0.4)
EOSINOPHIL NFR BLD: 2 % (ref 0–7)
ERYTHROCYTE [DISTWIDTH] IN BLOOD BY AUTOMATED COUNT: 13.1 % (ref 11.5–14.5)
GLOBULIN SER CALC-MCNC: 2.8 G/DL (ref 2–4)
GLUCOSE BLD STRIP.AUTO-MCNC: 139 MG/DL (ref 65–100)
GLUCOSE BLD STRIP.AUTO-MCNC: 139 MG/DL (ref 65–100)
GLUCOSE BLD STRIP.AUTO-MCNC: 147 MG/DL (ref 65–100)
GLUCOSE BLD STRIP.AUTO-MCNC: 151 MG/DL (ref 65–100)
GLUCOSE SERPL-MCNC: 132 MG/DL (ref 65–100)
GLUCOSE SERPL-MCNC: 143 MG/DL (ref 65–100)
HCT VFR BLD AUTO: 28.4 % (ref 36.6–50.3)
HGB BLD-MCNC: 8.4 G/DL (ref 12.1–17)
HGB BLD-MCNC: 9.5 G/DL (ref 12.1–17)
LYMPHOCYTES # BLD AUTO: 7 % (ref 12–49)
LYMPHOCYTES # BLD: 1 K/UL (ref 0.8–3.5)
MCH RBC QN AUTO: 30.8 PG (ref 26–34)
MCHC RBC AUTO-ENTMCNC: 33.5 G/DL (ref 30–36.5)
MCV RBC AUTO: 92.2 FL (ref 80–99)
MONOCYTES # BLD: 0.9 K/UL (ref 0–1)
MONOCYTES NFR BLD AUTO: 7 % (ref 5–13)
NEUTS SEG # BLD: 11.3 K/UL (ref 1.8–8)
NEUTS SEG NFR BLD AUTO: 84 % (ref 32–75)
PLATELET # BLD AUTO: 136 K/UL (ref 150–400)
POTASSIUM SERPL-SCNC: 4.5 MMOL/L (ref 3.5–5.1)
POTASSIUM SERPL-SCNC: 4.7 MMOL/L (ref 3.5–5.1)
PROT SERPL-MCNC: 5.2 G/DL (ref 6.4–8.2)
RBC # BLD AUTO: 3.08 M/UL (ref 4.1–5.7)
SERVICE CMNT-IMP: ABNORMAL
SODIUM SERPL-SCNC: 137 MMOL/L (ref 136–145)
SODIUM SERPL-SCNC: 137 MMOL/L (ref 136–145)
WBC # BLD AUTO: 13.5 K/UL (ref 4.1–11.1)

## 2017-08-02 PROCEDURE — 85025 COMPLETE CBC W/AUTO DIFF WBC: CPT | Performed by: ORTHOPAEDIC SURGERY

## 2017-08-02 PROCEDURE — 74011250637 HC RX REV CODE- 250/637: Performed by: FAMILY MEDICINE

## 2017-08-02 PROCEDURE — 80053 COMPREHEN METABOLIC PANEL: CPT | Performed by: ORTHOPAEDIC SURGERY

## 2017-08-02 PROCEDURE — 74011636637 HC RX REV CODE- 636/637: Performed by: FAMILY MEDICINE

## 2017-08-02 PROCEDURE — 85018 HEMOGLOBIN: CPT | Performed by: PHYSICIAN ASSISTANT

## 2017-08-02 PROCEDURE — 74011250637 HC RX REV CODE- 250/637: Performed by: HOSPITALIST

## 2017-08-02 PROCEDURE — 36430 TRANSFUSION BLD/BLD COMPNT: CPT

## 2017-08-02 PROCEDURE — 82962 GLUCOSE BLOOD TEST: CPT

## 2017-08-02 PROCEDURE — 74011250637 HC RX REV CODE- 250/637: Performed by: PHYSICIAN ASSISTANT

## 2017-08-02 PROCEDURE — 97161 PT EVAL LOW COMPLEX 20 MIN: CPT

## 2017-08-02 PROCEDURE — 65660000000 HC RM CCU STEPDOWN

## 2017-08-02 PROCEDURE — 74011250637 HC RX REV CODE- 250/637: Performed by: NURSE PRACTITIONER

## 2017-08-02 PROCEDURE — P9016 RBC LEUKOCYTES REDUCED: HCPCS | Performed by: PHYSICIAN ASSISTANT

## 2017-08-02 PROCEDURE — 97530 THERAPEUTIC ACTIVITIES: CPT

## 2017-08-02 PROCEDURE — 74011250636 HC RX REV CODE- 250/636: Performed by: ORTHOPAEDIC SURGERY

## 2017-08-02 PROCEDURE — 74011250636 HC RX REV CODE- 250/636: Performed by: PHYSICIAN ASSISTANT

## 2017-08-02 PROCEDURE — 80048 BASIC METABOLIC PNL TOTAL CA: CPT | Performed by: PHYSICIAN ASSISTANT

## 2017-08-02 PROCEDURE — 36415 COLL VENOUS BLD VENIPUNCTURE: CPT | Performed by: PHYSICIAN ASSISTANT

## 2017-08-02 PROCEDURE — 97165 OT EVAL LOW COMPLEX 30 MIN: CPT

## 2017-08-02 RX ORDER — SODIUM CHLORIDE 9 MG/ML
250 INJECTION, SOLUTION INTRAVENOUS AS NEEDED
Status: DISCONTINUED | OUTPATIENT
Start: 2017-08-02 | End: 2017-08-04 | Stop reason: HOSPADM

## 2017-08-02 RX ORDER — HYDROMORPHONE HYDROCHLORIDE 1 MG/ML
1-2 INJECTION, SOLUTION INTRAMUSCULAR; INTRAVENOUS; SUBCUTANEOUS
Status: DISCONTINUED | OUTPATIENT
Start: 2017-08-02 | End: 2017-08-04 | Stop reason: HOSPADM

## 2017-08-02 RX ADMIN — DOCUSATE SODIUM AND SENNOSIDES 1 TABLET: 8.6; 5 TABLET, FILM COATED ORAL at 09:49

## 2017-08-02 RX ADMIN — Medication 10 ML: at 15:05

## 2017-08-02 RX ADMIN — ACETAMINOPHEN 650 MG: 325 TABLET, FILM COATED ORAL at 03:05

## 2017-08-02 RX ADMIN — OXYCODONE HYDROCHLORIDE 10 MG: 5 TABLET ORAL at 11:22

## 2017-08-02 RX ADMIN — ACETAMINOPHEN 650 MG: 325 TABLET, FILM COATED ORAL at 22:27

## 2017-08-02 RX ADMIN — OXYCODONE HYDROCHLORIDE 10 MG: 5 TABLET ORAL at 22:27

## 2017-08-02 RX ADMIN — DOCUSATE SODIUM AND SENNOSIDES 1 TABLET: 8.6; 5 TABLET, FILM COATED ORAL at 17:56

## 2017-08-02 RX ADMIN — CEFAZOLIN 2 G: 1 INJECTION, POWDER, FOR SOLUTION INTRAMUSCULAR; INTRAVENOUS; PARENTERAL at 03:05

## 2017-08-02 RX ADMIN — Medication 10 ML: at 22:28

## 2017-08-02 RX ADMIN — OXYCODONE HYDROCHLORIDE 5 MG: 5 TABLET ORAL at 01:00

## 2017-08-02 RX ADMIN — METOPROLOL TARTRATE 12.5 MG: 25 TABLET ORAL at 09:49

## 2017-08-02 RX ADMIN — HYDROMORPHONE HYDROCHLORIDE 1 MG: 1 INJECTION, SOLUTION INTRAMUSCULAR; INTRAVENOUS; SUBCUTANEOUS at 00:58

## 2017-08-02 RX ADMIN — OXYCODONE HYDROCHLORIDE 10 MG: 5 TABLET ORAL at 15:23

## 2017-08-02 RX ADMIN — Medication 10 ML: at 07:07

## 2017-08-02 RX ADMIN — INSULIN LISPRO 2 UNITS: 100 INJECTION, SOLUTION INTRAVENOUS; SUBCUTANEOUS at 09:50

## 2017-08-02 RX ADMIN — Medication 500 MG: at 09:00

## 2017-08-02 RX ADMIN — GABAPENTIN 100 MG: 100 CAPSULE ORAL at 16:37

## 2017-08-02 RX ADMIN — ACETAMINOPHEN 650 MG: 325 TABLET, FILM COATED ORAL at 16:38

## 2017-08-02 RX ADMIN — GABAPENTIN 100 MG: 100 CAPSULE ORAL at 22:28

## 2017-08-02 RX ADMIN — GABAPENTIN 100 MG: 100 CAPSULE ORAL at 09:49

## 2017-08-02 RX ADMIN — SODIUM CHLORIDE 125 ML/HR: 900 INJECTION, SOLUTION INTRAVENOUS at 01:25

## 2017-08-02 RX ADMIN — Medication 10 ML: at 15:06

## 2017-08-02 RX ADMIN — ACETAMINOPHEN 650 MG: 325 TABLET, FILM COATED ORAL at 09:49

## 2017-08-02 RX ADMIN — INSULIN LISPRO 2 UNITS: 100 INJECTION, SOLUTION INTRAVENOUS; SUBCUTANEOUS at 17:49

## 2017-08-02 RX ADMIN — THERA TABS 1 TABLET: TAB at 09:50

## 2017-08-02 RX ADMIN — HYDROMORPHONE HYDROCHLORIDE 1 MG: 1 INJECTION, SOLUTION INTRAMUSCULAR; INTRAVENOUS; SUBCUTANEOUS at 07:05

## 2017-08-02 RX ADMIN — LISINOPRIL 5 MG: 5 TABLET ORAL at 09:49

## 2017-08-02 NOTE — PROGRESS NOTES
7:40 PM  Spoke with Dr. Justine Day, on call for Dr. Daryl Raman this PM, concerning pt vitals signs, BP. Pt BP have been running low. Fluids discontinued earlier today. Pt vitals are otherwise within pt normal and pt is asymptomatic of low BP at this time. Pt is awake A&O x4, sitting, conversing with RN. States no dizziness, light headiness, sweating or chills. Per Dr. Justine Day to continue to monitor pt. At next vital check if blood pressure is below current may re order/ start IV fluids at 125mL. To call  If BP continues to be low after restart of fluids.      08/02/17 1930   Vitals   Temp 98.3 °F (36.8 °C)   Temp Source Oral   Pulse (Heart Rate) 78   Heart Rate Source Monitor   Resp Rate 18   O2 Sat (%) 100 %   Level of Consciousness Alert   BP 90/56   MAP (Calculated) 67   BP 1 Location Right arm   BP 1 Method Automatic   BP Patient Position At rest   Cardiac Rhythm NSR   MEWS Score 2   Alarms Set and Audible Cardiac alarms   Box Number 658   Electrodes Replaced Yes   Pain 1   Pain Scale 1 Adult Nonverbal Pain Scale   Pain Intensity 1 0   Patient Stated Pain Goal 0   Oxygen Therapy   O2 Device Room air   Patient Observation   Special Appliances/Equipment Bed (comment)   Repositioned Head of bed elevated (degrees)   Patient Turned Supine   Observations q1 hour rounding done   Ambulate No (Comment)

## 2017-08-02 NOTE — PROGRESS NOTES
Physical Therapy  Orders received, chart reviewed and patient evaluated by physical therapy. Recommend patient to discharge to inpt rehab vs SNF pending progress with skilled acute care physical therapy. Full evaluation to follow.      Wilber Carpio, PT, DPT

## 2017-08-02 NOTE — PROGRESS NOTES
Problem: Mobility Impaired (Adult and Pediatric)  Goal: *Acute Goals and Plan of Care (Insert Text)  Physical Therapy Goals  Initiated 8/2/2017    1. Patient will move from supine to sit and sit to supine in bed with minimal assistance/contact guard assist within 4 days. 2. Patient will perform sit to stand with moderate assistance within 4 days. 3. Patient will ambulate with maximal assistance for 10 feet with the least restrictive device within 4 days. 4. Patient will verbalize and demonstrate understanding of spinal precautions (No bending, lifting greater than 5 lbs, or twisting; log-roll technique; frequent repositioning as instructed) within 4 days. PHYSICAL THERAPY TREATMENT (LATE ENTRY)  Patient: Kavon Barrett (24 y.o. male)  Date: 8/2/2017  Diagnosis: Intractable pain  THORACIC AND LUMBAR STENOSIS Intractable pain  Procedure(s) (LRB):  T8-S1 THORACOLUMBAR  LAMINECTOMY WITH T6-L1 POSTERIOR SPINAL FUSION/ALLOGRAFT  (N/A) 1 Day Post-Op  Precautions: Spinal, Fall, TLSO (though may work with therapy without-no chair, I assume)      ASSESSMENT:  Pt seen for BID tx this afternoon with OT 2* increased skilled requirement. Pt agreeable to bed mobility and sit to stand transfer today followed by side steps at EOB (limited due to no brace and pt continues to receive PRBCs via central line). Pt requires assistance x2 for transfers and side steps to left. Pt remains mentally confused at times and has trouble recalling his BLT precautions. Following session, pt back to bed in NAD. He remains appropriate for D/C to inpt rehab once medically stable and will benefit from 3 hrs of therapy per day for optimal recovery towards independence.   Progression toward goals:  [ ]      Improving appropriately and progressing toward goals  [X]      Improving slowly and progressing toward goals  [ ]      Not making progress toward goals and plan of care will be adjusted       PLAN:  Patient continues to benefit from skilled intervention to address the above impairments. Continue treatment per established plan of care. Discharge Recommendations:  Inpatient Rehab  Further Equipment Recommendations for Discharge:  NA       SUBJECTIVE:   Patient stated Oh, ot hurts so badly in that leg. ..  Pt with significant RLE (foot) nerve impairment. The patient stated 1/3 back precautions. Reviewed all 3 with patient. OBJECTIVE DATA SUMMARY:   Critical Behavior:  Neurologic State: Alert  Orientation Level: Oriented to person, Oriented to place, Oriented to situation, Oriented to time (Very minor intermittent confusion?)  Cognition: Follows commands, Appropriate for age attention/concentration, Poor safety awareness (Unable to recall BLT precautions)  Safety/Judgement: Fall prevention  Functional Mobility Training:  Bed Mobility:  Log Rolling: Maximum assistance; Additional time  Supine to Sit: Maximum assistance; Additional time;Assist x2  Sit to Supine: Moderate assistance; Additional time;Assist x2  Scooting: Maximum assistance; Additional time  Transfers: postural cues for extension/retraction  Sit to Stand: Maximum assistance; Additional time;Assist x2  Stand to Sit: Moderate assistance; Additional time;Assist x2  Balance:  Sitting: Impaired; With support  Sitting - Static: Good (unsupported)  Sitting - Dynamic: Fair (occasional)  Standing: Impaired; With support  Standing - Static: Poor  Standing - Dynamic : Poor  Ambulation/Gait Training:  Gait Description (WDL): pt able to take 5 steps to left with moderate x2 assistance for BUE support following extensive cues and gluteal support for erect posture  Pain:  Pain Scale 1: Adult Nonverbal Pain Scale  Pain Intensity 1: 0  Pain Location 1: Back  Pain Orientation 1: Mid  Pain Description 1: Sharp  Pain Intervention(s) 1: Medication (see MAR)  Activity Tolerance:   VSS  Please refer to the flowsheet for vital signs taken during this treatment.   After treatment:   [ ]  Patient left in no apparent distress sitting up in chair  [X]  Patient left in no apparent distress in bed  [X]  Call bell left within reach  [X]  Nursing notified  [ ]  Caregiver present  [ ]  Bed alarm activated      COMMUNICATION/COLLABORATION:   The patients plan of care was discussed with: Registered Nurse     Cherylene Howard   Time Calculation: 23 mins

## 2017-08-02 NOTE — PROGRESS NOTES
Problem: Self Care Deficits Care Plan (Adult)  Goal: *Acute Goals and Plan of Care (Insert Text)  Occupational Therapy Goals  Initiated 8/2/2017    1. Patient will perform lower body dressing with moderate assistance using reacher, sock aide, elastic shoe laces, long handled shoe horns PRN within 7 days. 2. Patient will perform toilet transfer with minimal assistance/contact guard assist using most appropriate DME within 7 days. 3. Patient will upper body dressing at minimal assistance/contact guard assist within 7 days. 4. Patient will don/doff back brace at minimal assistance/contact guard assist within 7 days. 5. Patient will verbalize/demonstrate 3/3 back precautions during ADL tasks without cues within 7 days. OCCUPATIONAL THERAPY EVALUATION  Patient: Su Snyder (89 y.o. male)  Date: 8/2/2017  Primary Diagnosis: Intractable pain  THORACIC AND LUMBAR STENOSIS  Procedure(s) (LRB):  T8-S1 THORACOLUMBAR  LAMINECTOMY WITH T6-L1 POSTERIOR SPINAL FUSION/ALLOGRAFT  (N/A) 1 Day Post-Op   Precautions:   Spinal, Fall      ASSESSMENT :  Based on the objective data described below, the patient presents with overall Mod-Max A x2 for functional mobility, up to Total A for lower body ADLs, and independent-Mod A for upper body ADLs s/p thoracolumbar surgical intervention POD 1. Patient received supine in bed with nursing present; experiencing increased neuropathy in bilateral feet. Patient with RLE foot drop this AM, following PM session was able to state it felt \"stronger\". Patient with significant limitations by pain and general deconditioning. Able to complete log roll technique with overall Mod A, requiring few minutes seated EOB to stabilize balance. Patient then able to attempt sit>stand with Max A x2, gait belt, and boost from sheet. Patient able to take few sidesteps towards [facility] and return to supine. Patient able to recall 1/3 spinal precautions 15 minutes following full 3/3 cuing.  Patient will require reinforcement on spinal precautions, education on compensatory techniques for lower body dressing, and continued therapeutic interventions to maximize activity tolerance and strength needed for all functional tasks. Patient will benefit from inpatient rehab when medically stable, will tolerate 3 hours/day of therapy. Patient will benefit from skilled intervention to address the above impairments. Patients rehabilitation potential is considered to be Good  Factors which may influence rehabilitation potential include:   [ ]             None noted  [ ]             Mental ability/status  [ ]             Medical condition  [ ]             Home/family situation and support systems  [ ]             Safety awareness  [ ]             Pain tolerance/management  [ ]             Other:        PLAN :  Recommendations and Planned Interventions:  [X]               Self Care Training                  [X]        Therapeutic Activities  [X]               Functional Mobility Training    [ ]        Cognitive Retraining  [X]               Therapeutic Exercises           [X]        Endurance Activities  [X]               Balance Training                   [ ]        Neuromuscular Re-Education  [ ]               Visual/Perceptual Training     [X]   Home Safety Training  [X]               Patient Education                 [X]        Family Training/Education  [ ]               Other (comment):     Frequency/Duration: Patient will be followed by occupational therapy 5 times a week to address goals. Discharge Recommendations: Inpatient Rehab  Further Equipment Recommendations for Discharge: TBD       SUBJECTIVE:   Patient stated It's just my back hurting.       OBJECTIVE DATA SUMMARY:   HISTORY:   Past Medical History:   Diagnosis Date    Arthritis       Osteo    Cancer ([facility] [address]       states precancer skin    DDD (degenerative disc disease), lumbar      Diabetes ([facility] [address]       TYPE II    Hypertension       controlled by meds.     Lumbago      Lumbar spinal stenosis      Other ill-defined conditions      S/P aortic valve replacement with porcine valve       Past Surgical History:   Procedure Laterality Date    CARDIAC SURG PROCEDURE UNLIST   2008     aortic valve replacement    HX APPENDECTOMY   1953    HX GI         colonoscopy x 2    HX ORTHOPAEDIC Bilateral       CARPAL TUNNEL    HX SHOULDER REPLACEMENT Right 2014    NEUROLOGICAL PROCEDURE UNLISTED         Dexter carpal tunnel release    TOTAL KNEE ARTHROPLASTY Right 1/2013        Prior Level of Function/Home Situation: Per patient report, lives at home at baseline. Mod I with RW and some assist for ADLs. Expanded or extensive additional review of patient history:      Home Situation  Home Environment: Private residence  # Steps to Enter: 3  Rails to Enter: Yes  Hand Rails : Bilateral  One/Two Story Residence: One story  Living Alone: Yes  Support Systems: Child(gentry)  Patient Expects to be Discharged to[de-identified] Rehabilitation facility  Current DME Used/Available at Home: Adaptive dressing aides, Cane, quad, Commode, bedside, Grab bars, Lift chair, Shower chair  [ ]  Right hand dominant   [ ]  Left hand dominant     EXAMINATION OF PERFORMANCE DEFICITS:  Cognitive/Behavioral Status:  Neurologic State: Alert  Orientation Level: Oriented to person;Oriented to place;Oriented to situation;Oriented to time (Very minor intermittent confusion?)  Cognition: Follows commands; Appropriate for age attention/concentration;Poor safety awareness (Unable to recall BLT precautions)  Perception: Appears intact  Perseveration: No perseveration noted  Safety/Judgement: Fall prevention     Skin: Appears intact     Edema: None noted in BUEs     Hearing:   Auditory  Auditory Impairment: None     Vision/Perceptual:       WFL       Range of Motion:  AROM: Generally decreased, functional  PROM: Generally decreased, functional        Strength:  Strength: Generally decreased, functional Coordination:  Coordination: Generally decreased, functional  Fine Motor Skills-Upper: Left Intact; Right Intact    Gross Motor Skills-Upper: Left Intact; Right Intact     Tone & Sensation:  Tone: Normal  Sensation: Intact        Balance:  Sitting: Impaired; With support  Sitting - Static: Good (unsupported)  Sitting - Dynamic: Fair (occasional)  Standing: Impaired; With support  Standing - Static: Poor  Standing - Dynamic : Poor     Functional Mobility and Transfers for ADLs:  Bed Mobility:  Rolling: Maximum assistance; Additional time  Supine to Sit: Maximum assistance; Additional time;Assist x2  Sit to Supine: Moderate assistance; Additional time;Assist x2  Scooting: Maximum assistance; Additional time     Transfers:  Sit to Stand: Maximum assistance; Additional time;Assist x2  Stand to Sit: Moderate assistance; Additional time;Assist x2  Toilet Transfer : Total assistance (Bedpan and richardson currently)     ADL Assessment:  Feeding: Independent     Oral Facial Hygiene/Grooming: Independent     Bathing: Maximum assistance     Upper Body Dressing: Moderate assistance     Lower Body Dressing: Total assistance     Toileting: Total assistance                 ADL Intervention and task modifications:     Lower Body Dressing Assistance  Socks: Total assistance (dependent)  Leg Crossed Method Used:  (Able to achieve intermediate)  Position Performed: Seated edge of bed     Cognitive Retraining  Safety/Judgement: Fall prevention     Functional Measure:  Barthel Index:      Bathin  Bladder: 0  Bowels: 10  Groomin  Dressin  Feeding: 10  Mobility: 0  Stairs: 0  Toilet Use: 0  Transfer (Bed to Chair and Back): 5  Total: 30         Barthel and G-code impairment scale:  Percentage of impairment CH  0% CI  1-19% CJ  20-39% CK  40-59% CL  60-79% CM  80-99% CN  100%   Barthel Score 0-100 100 99-80 79-60 59-40 20-39 1-19    0   Barthel Score 0-20 20 17-19 13-16 9-12 5-8 1-4 0      The Barthel ADL Index: Guidelines  1.  The index should be used as a record of what a patient does, not as a record of what a patient could do. 2. The main aim is to establish degree of independence from any help, physical or verbal, however minor and for whatever reason. 3. The need for supervision renders the patient not independent. 4. A patient's performance should be established using the best available evidence. Asking the patient, friends/relatives and nurses are the usual sources, but direct observation and common sense are also important. However direct testing is not needed. 5. Usually the patient's performance over the preceding 24-48 hours is important, but occasionally longer periods will be relevant. 6. Middle categories imply that the patient supplies over 50 per cent of the effort. 7. Use of aids to be independent is allowed. Jazz Simmons., Barthel, D.W. ([id number]). Functional evaluation: the Barthel Index. [facility] (14)2. TALIA SmithF, Kelly Love., Bianca Ghotra., Dereje Mello, [address] [place] [address] (1999). Measuring the change indisability after inpatient rehabilitation; comparison of the responsiveness of the Barthel Index and Functional [place] Measure. Journal of Neurology, Neurosurgery, and Psychiatry, 66(4), 274-227. Casandra Irby, N.J.A, ADRIAN Whaley.M, & Yessenia Casanova, M.A. (2004.) Assessment of post-stroke quality of life in cost-effectiveness studies: The usefulness of the Barthel Index and the EuroQoL-5D. Quality of Life Research, 13, 770-37            G codes: In compliance with CMSs Claims Based Outcome Reporting, the following G-code set was chosen for this patient based on their primary functional limitation being treated: The outcome measure chosen to determine the severity of the functional limitation was the Barthel Index with a score of 30/ 100which was correlated with the impairment scale.       · Self Care:               - CURRENT STATUS:    CL - 60%-79% impaired, limited or restricted               - GOAL STATUS:           CK - 40%-59% impaired, limited or restricted               - D/C STATUS:                       ---------------To be determined---------------      Occupational Therapy Evaluation Charge Determination   History Examination Decision-Making   LOW Complexity : Brief history review  HIGH Complexity : 5 or more performance deficits relating to physical, cognitive , or psychosocial skils that result in activity limitations and / or participation restrictions HIGH Complexity : Patient presents with comorbidities that affect occupational performance. Signifigant modification of tasks or assistance (eg, physical or verbal) with assessment (s) is necessary to enable patient to complete evaluation       Based on the above components, the patient evaluation is determined to be of the following complexity level: LOW   Pain:  Pain Scale 1: Adult Nonverbal Pain Scale  Pain Intensity 1: 0  Pain Location 1: Back  Pain Orientation 1: Mid  Pain Description 1: Sharp  Pain Intervention(s) 1: Medication (see MAR)  Activity Tolerance:   Good. Please refer to the flowsheet for vital signs taken during this treatment. After treatment:   [ ] Patient left in no apparent distress sitting up in chair  [X] Patient left in no apparent distress in bed  [X] Call bell left within reach  [X] Nursing notified  [ ] Caregiver present  [ ] Bed alarm activated      COMMUNICATION/EDUCATION:   The patients plan of care was discussed with: Physical Therapist and Registered Nurse.  [X] Home safety education was provided and the patient/caregiver indicated understanding. [X] Patient/family have participated as able in goal setting and plan of care. [ ] Patient/family agree to work toward stated goals and plan of care. [ ] Patient understands intent and goals of therapy, but is neutral about his/her participation. [ ] Patient is unable to participate in goal setting and plan of care.   This patients plan of care is appropriate for delegation to [facility].      Thank you for this referral.  Robles Mckeon, OT  Time Calculation: 17 mins

## 2017-08-02 NOTE — CONSULTS
PHYSICAL MEDICINE AND REHABILITATION CONSULT      Patient: Regi Erazo Date: 7/27/2017  Admit Diagnosis: Intractable pain  THORACIC AND LUMBAR STENOSIS  Admitting Physician: Derik Atkinson MD  Referring Physician: Derik Atkinson MD  Primary Care Physician: Susan Cabrera MD  Treatment Team: Attending Provider: Derik Atkinson MD; Consulting Provider: Delfina Koehler MD; Utilization Review: Sydney Matta RN; Consulting Provider: Marichuy Rodrigez NP; Care Manager: WILBER Liang; Consulting Provider: Kala Oliveira MD    Date of Consultation:  August 2, 2017    Reason for consultation: We are being asked to render an opinion regarding the patient's rehab needs. IMPRESSION:   1. 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 Right footdrop secondary to #1  3. Allodynia secondary to #1  4. Acute blood loss anemia, ongoing blood transfusion  5. Acute post-op pain on chronic back pain  6. Hypocalcemia  7. Renal insufficiency, may be pre-renal from volume contraction  8. DM type II, controlled  9. Tissue AVR  10. Essential HTN      Recommendations:   1) Continue physical and occupational therapy. Rehabilitation potential is good . Once medically stable,  He will benefit from acute inpatient rehabilitation to maximize function as well as continue monitoring and managing medically complex co-morbidities. Rehabilitation goal is supervision to modified independence in ADLs, transfers and ambulation. The expected length of stay is about 10 -14 days. Various rehab options and program discussed with patient and 2 daughters. 2) DVT prophylaxis: SCDs, sana hose, mobilization. 3) Bowel program: Continue current bowel regimen. 4) Pain management:Neuropathic - continue Gabapentin and adjust to effective dose; Somatic -  continue sked APAP Oxycodone, prn cryotx.     Discussed with Family, Physical Therapy/Occupational Therapy. Thank you. Bronwyn Plata CHIEF COMPLAINT: back pain    HISTORY OF PRESENT ILLNESS:    Patient is a [de-identified] y.o., right handed, , male,admitted for back surgery. Records reviewed. Patient with h/o C3/4 ACDF 10/2015 &  chronic back pain, that has progressively worsened since March 2017, despite EDWIN and conservative measures. He had associated numbness in BLE and developed a right footdrop. He started using a RW about 3 weeks ago, stopped driving. Patient admitted to [facility] 7/27/17 after twisting his back and experiencing such severe pain down BLE, unable to move. Spine MRI revealed severe TLS stenosis and cord compression/signal changes. After medical clearance, on 8/1/17 he underwent T7-S1 laminectomy, L1-L2 diskectomy  & T6-L1 PSF. Back pain currently 6/10 at rest and increases to 10/10 with bed repositioning. Pain meds decrease pain. He was started on Gabapentin for painful paresthesias of legs and feet. Hgb currently 8.4, blood transfusion ongoing. PT and OT ordered and pending. Past Medical History:   Diagnosis Date    Arthritis     Osteo    Cancer [facility])     states precancer skin    DDD (degenerative disc disease), lumbar     Diabetes ([facility] [address]     TYPE II    Hypertension     controlled by meds.     Lumbago     Lumbar spinal stenosis     Other ill-defined conditions     S/P aortic valve replacement with porcine valve        Past Surgical History:   Procedure Laterality Date    CARDIAC SURG PROCEDURE UNLIST  2008    aortic valve replacement    HX APPENDECTOMY  1953    HX GI      colonoscopy x 2    HX ORTHOPAEDIC Bilateral     CARPAL TUNNEL    HX SHOULDER REPLACEMENT Right 2014    NEUROLOGICAL PROCEDURE UNLISTED      Dexter carpal tunnel release    TOTAL KNEE ARTHROPLASTY Right 1/2013      Family History   Problem Relation Age of Onset    Diabetes Mother     Lung Disease Father     Suicide Brother     Diabetes Brother     Anesth Problems Neg Hx Social History - , lives alone 1 SH, 3 EDVIN, supportive daughters who can check on him but willing to stay temporarily on eventual dc home. Substance Use Topics    Smoking status: Current Some Day Smoker    Smokeless tobacco: Current User      Comment: SMOKES CIGAR OCC.  Alcohol use 2.0 oz/week     4 Cans of beer per week       Functional History:   Premorbid - Independent in ADLs and ambulation up until 3 wks ago when he started using a RW, driving but also stopped 3 wks ago  Current level of function - pending PT and OT evals   ,  ,  ,  ,   ,                   Allergies   Allergen Reactions    Prednisone Drowsiness and Vertigo     \"loopy, confusion\"      Prior to Admission medications    Medication Sig Start Date End Date Taking? Authorizing Provider   CALCIUM PO Take  by mouth daily. Yes Historical Provider   glipiZIDE SR (GLUCOTROL XL) 5 mg CR tablet Take 5 mg by mouth daily. Yes Historical Provider   lisinopril (PRINIVIL, ZESTRIL) 20 mg tablet Take 20 mg by mouth daily. Yes Historical Provider   traMADol (ULTRAM) 50 mg tablet Take 50 mg by mouth every six (6) hours as needed for Pain. Yes Historical Provider   HYDROcodone-acetaminophen (NORCO) 5-325 mg per tablet Take 1 Tab by mouth every four (4) hours as needed for Pain. 7/26/17  Yes Historical Provider   aspirin delayed-release 81 mg tablet Take 81 mg by mouth daily. Yes Historical Provider   coenzyme Q-10 (CO Q-10) 200 mg capsule Take 200 mg by mouth daily. Yes Historical Provider   red yeast rice extract 600 mg cap Take 1,200 mg by mouth daily. Yes Historical Provider   multivitamin (ONE A DAY) tablet Take 1 Tab by mouth daily. Yes Historical Provider   magnesium 250 mg Tab Take 250 mg by mouth daily.    Yes Historical Provider     Current Facility-Administered Medications   Medication Dose Route Frequency    HYDROmorphone (PF) (DILAUDID) injection 1-2 mg  1-2 mg IntraVENous Q4H PRN    0.9% sodium chloride infusion 250 mL 250 mL IntraVENous PRN    sodium chloride (NS) flush 5-10 mL  5-10 mL IntraVENous Q8H    sodium chloride (NS) flush 5-10 mL  5-10 mL IntraVENous PRN    0.9% sodium chloride infusion  125 mL/hr IntraVENous CONTINUOUS    sodium chloride (NS) flush 5-10 mL  5-10 mL IntraVENous Q8H    sodium chloride (NS) flush 5-10 mL  5-10 mL IntraVENous PRN    naloxone (NARCAN) injection 0.4 mg  0.4 mg IntraVENous PRN    senna-docusate (PERICOLACE) 8.6-50 mg per tablet 1 Tab  1 Tab Oral BID    [START ON 8/3/2017] bisacodyl (DULCOLAX) suppository 10 mg  10 mg Rectal DAILY PRN    acetaminophen (TYLENOL) tablet 650 mg  650 mg Oral Q6H    oxyCODONE IR (ROXICODONE) tablet 5 mg  5 mg Oral Q3H PRN    oxyCODONE IR (ROXICODONE) tablet 10 mg  10 mg Oral Q3H PRN    ondansetron (ZOFRAN) injection 4 mg  4 mg IntraVENous Q4H PRN    diphenhydrAMINE (BENADRYL) injection 12.5 mg  12.5 mg IntraVENous Q6H PRN    lisinopril (PRINIVIL, ZESTRIL) tablet 5 mg  5 mg Oral DAILY    coenzyme Q-10 (CO Q-10) capsule 200 mg  200 mg Oral DAILY    magnesium oxide tab 500 mg  500 mg Oral DAILY    gabapentin (NEURONTIN) capsule 100 mg  100 mg Oral TID    sodium chloride (NS) flush 5-10 mL  5-10 mL IntraVENous Q8H    sodium chloride (NS) flush 5-10 mL  5-10 mL IntraVENous PRN    therapeutic multivitamin (THERAGRAN) tablet 1 Tab  1 Tab Oral DAILY    glucose chewable tablet 16 g  4 Tab Oral PRN    glucagon (GLUCAGEN) injection 1 mg  1 mg IntraMUSCular PRN    insulin lispro (HUMALOG) injection   SubCUTAneous AC&HS    dextrose 10 % infusion 125-250 mL  125-250 mL IntraVENous PRN    metoprolol tartrate (LOPRESSOR) tablet 12.5 mg  12.5 mg Oral Q12H       Review of Systems:     []            Unable to obtain ROS due to     []           mental status   []           sedated   []           intubated   [x]            Total of 12 systems reviewed as follows:  Constitutional: negative fever, negative chills, negative weight loss  Eyes:   Double vision post-op resolved  ENT:   negative difficulty swallowing, sore throat, tongue or lip swelling  Respiratory:  negative cough, negative dyspnea  Cards:  negative for chest pain, palpitations, lower extremity edema  GI:   negative for nausea, vomiting, diarrhea, and abdominal pain, LBM = 8/1/17  :  negative for frequency, dysuria or hematuria, no richardson discomfort  Integument:  negative for rash and pruritus  Heme:  negative for easy bruising and bleeding  Musculoskel: negative for myalgias, neck pain, + back pain   Neuro:  negative for headaches, dizziness, vertigo, + BLE numbness, paresthesias and  weakness  Psych:  negative for feelings of anxiety, depression     Physical Exam:  General:  Vital Signs:      Skin:   HEENT:  Neck: Alert, not in distress. Blood pressure 108/49, pulse 83, temperature 98.2 °F (36.8 °C), resp. rate 13, height 5' 10\" (1.778 m), weight 82 kg (180 lb 12.4 oz), SpO2 100 %. , BMI (calculated): 25.9 (08/02/17 0800)   Incision - with dressing, c/d/i, hemovac  PERRL, anicteric sclerae, oropharynx clear  Supple, thyroid not palpable   Lungs:   Clear to auscultation bilaterally. Heart:  Regular rate and rhythm, , no murmur, click, rub or gallop. Abdomen:   Soft, non-tender. Bowel sounds present. Genitourinary:  Musculoskeletal: Richardson catheter in place, draining clear, yellow urine. Calves soft; no lower extremity edema. ROM within functional limits for all limbs. Left shoulder chronic LOM   Neuro:          Psych:     Speech and language clear and fluent, Oriented x4, memory and cognition intact, follows all 1- and 2-step verbal directives; Cranial nerves II-XII: intact. Sensory: BUE intact to light touch, BLE - allodynia from mid leg down to both feet; Motor: BUE and BLE 4+ to 5/5 except for right footdrop, DF 1/5.    Pleasant, cooperative, motivated         Labs/Studies:  Recent Labs      08/02/17 0316   HGB  8.4*     Recent Labs      08/02/17   0316  08/01/17   1140  07/31/17   1013 NA  137   --    --    K  4.7   --    --    CL  107   --    --    CO2  26   --    --    GLU  132*   --    --    BUN  32*   --    --    CREA  1.36*   --    --    CA  7.3*   --    --    INR   --   1.1  1.0     Lab Results   Component Value Date/Time    Hemoglobin A1c 5.9 2017 01:27 PM     No results for input(s): TROIQ, CPK, CKMB in the last 72 hours. Lab Results   Component Value Date/Time    Color YELLOW/STRAW 2017 10:13 AM    Appearance CLEAR 2017 10:13 AM    Specific gravity 1.012 2017 10:13 AM    pH (UA) 6.5 2017 10:13 AM    Protein NEGATIVE  2017 10:13 AM    Glucose NEGATIVE  2017 10:13 AM    Ketone NEGATIVE  2017 10:13 AM    Bilirubin NEGATIVE  2017 10:13 AM    Urobilinogen 0.2 2017 10:13 AM    Nitrites NEGATIVE  2017 10:13 AM    Leukocyte Esterase TRACE 2017 10:13 AM    Epithelial cells FEW 2017 10:13 AM    Bacteria NEGATIVE  2017 10:13 AM    WBC 0-4 2017 10:13 AM    RBC 0-5 2017 10:13 AM     Lab Results   Component Value Date/Time    Specimen Description: NARES 10/01/2013 12:20 PM    Culture result: MRSA NOT PRESENT 2015 03:28 PM    Culture result:  2015 03:28 PM         Screening of patient nares for MRSA is for surveillance purposes and, if positive, to facilitate isolation considerations in high risk settings. It is not intended for automatic decolonization interventions per se as regimens are not sufficiently effective to warrant routine use. Culture result:  10/01/2013 12:20 PM     MRSA NOT PRESENT      Screening of patient nares for MRSA is for surveillance purposes and, if positive, to facilitate isolation considerations in high risk settings. It is not intended for automatic decolonization interventions per se as regimens are not sufficiently effective to warrant routine use. [id number]  Lumbar MRI - Severe chronic degenerative changes with diffuse spinal stenosis L1-S1.   Bone marrow edema seen in the endplates at T93-Z9 likely degenerative in nature prior. There is diffuse severe chronic disc degeneration. The conus  shows some signal changes at that level T12-L1 likely related to the stenosis. There also  some signal changes in the distal the distal cord hilar likely also related to a stenosis and cord compression. Thoracic MRI - Multifocal level degenerative change with severe canal stenosis at T8-9 with associated cord edema.  Moderate narrowing of the canal at T10-T11 with associated cord edema. Severe narrowing of the canal L1-L2 with left paracentral disc extrusion narrowing the left subarticular zone and left lateral recess posterior to L2    Cervical MRI - Postsurgical and  degenerative changes with multilevel spinal and foraminal stenosis. There may be minimal signal changes in the cord at the C4-5 level which would be related to a spinal stenosis. The alignment is satisfactory, there has been prior surgery with fusion at C3-4  No definite paraspinal masses or fluid collection. No evidence to suggest bone marrow replacement.         Signed By: Radha Mendez MD     August 2, 2017

## 2017-08-02 NOTE — PROGRESS NOTES
Problem: Mobility Impaired (Adult and Pediatric)  Goal: *Acute Goals and Plan of Care (Insert Text)  Physical Therapy Goals  Initiated 8/2/2017    1. Patient will move from supine to sit and sit to supine in bed with minimal assistance/contact guard assist within 4 days. 2. Patient will perform sit to stand with moderate assistance within 4 days. 3. Patient will ambulate with maximal assistance for 10 feet with the least restrictive device within 4 days. 4. Patient will verbalize and demonstrate understanding of spinal precautions (No bending, lifting greater than 5 lbs, or twisting; log-roll technique; frequent repositioning as instructed) within 4 days. PHYSICAL THERAPY EVALUATION  Patient: Luba Lucio (3 y.o. male)  Date: 8/2/2017  Primary Diagnosis: Intractable pain  THORACIC AND LUMBAR STENOSIS  Procedure(s) (LRB):  T8-S1 THORACOLUMBAR  LAMINECTOMY WITH T6-L1 POSTERIOR SPINAL FUSION/ALLOGRAFT  (N/A) 1 Day Post-Op   Precautions:  Spinal, Fall , TLSO      ASSESSMENT :  Based on the objective data described below, the patient presents with globally decreased strength, balance, ROM, and increased pain resulting in decreased functional mobility from reported baseline status of Henri-Ilene. Pt's DTRs present and attempting to provide some hx as pt is initially drowsy today (improved with mobility and sitting up). Pt with order from MD for TLSO, however, pt has yet to be fitted. Pt able to complete bed mobility and transfer attempt (full stance not achieved) with assist x 1-2 this morning during blood administration. Pt to be seen for BID session this afternoon with OT 2* elevated skilled care needs. Pt continues to have drop foot on RLE and limited strength globally resulting in maxA for multiple tasks today. Will continue to progress as able and appropriate and recommend inpt rehab for optimal recovery from extensive spinal surgery. Pt is motivated and will benefit from intensive 3 hrs per day. Patient will benefit from skilled intervention to address the above impairments. Patients rehabilitation potential is considered to be Fair  Factors which may influence rehabilitation potential include:   [ ]         None noted  [ ]         Mental ability/status  [ ]         Medical condition  [ ]         Home/family situation and support systems  [ ]         Safety awareness  [X]         Pain tolerance/management  [ ]         Other:        PLAN :  Recommendations and Planned Interventions:  [X]           Bed Mobility Training             [ ]    Neuromuscular Re-Education  [X]           Transfer Training                   [ ]    Orthotic/Prosthetic Training  [X]           Gait Training                         [ ]    Modalities  [X]           Therapeutic Exercises           [ ]    Edema Management/Control  [X]           Therapeutic Activities            [X]    Patient and Family Training/Education  [ ]           Other (comment):     Frequency/Duration: Patient will be followed by physical therapy  twice daily to address goals. Discharge Recommendations: Inpatient Rehab  Further Equipment Recommendations for Discharge: NA       SUBJECTIVE:   Patient stated Bob Hare are my daughters, who are you? .  Pt initially confused by multiple persons and nely on in his room. OBJECTIVE DATA SUMMARY:   HISTORY:    Past Medical History:   Diagnosis Date    Arthritis       Osteo    Cancer ([facility] [address]       states precancer skin    DDD (degenerative disc disease), lumbar      Diabetes ([facility] [address]       TYPE II    Hypertension       controlled by meds.     Lumbago      Lumbar spinal stenosis      Other ill-defined conditions      S/P aortic valve replacement with porcine valve       Past Surgical History:   Procedure Laterality Date    CARDIAC SURG PROCEDURE UNLIST   2008     aortic valve replacement    HX APPENDECTOMY   1953    HX GI         colonoscopy x 2    HX ORTHOPAEDIC Bilateral       CARPAL TUNNEL    HX SHOULDER REPLACEMENT Right 2014    NEUROLOGICAL PROCEDURE UNLISTED         Dexter carpal tunnel release    TOTAL KNEE ARTHROPLASTY Right 1/2013     Prior Level of Function/Home Situation: pt Henri with RW prior to arrival though did require some assistance for ADLs  Personal factors and/or comorbidities impacting plan of care: supportive children     Home Situation  Home Environment: Private residence  # Steps to Enter: 3  Rails to Enter: Yes  Hand Rails : Bilateral  One/Two Story Residence: One story  Living Alone: Yes  Support Systems: Child(gentry)  Patient Expects to be Discharged to[de-identified] Rehabilitation facility  Current DME Used/Available at Home: Adaptive dressing aides, Cane, quad, Commode, bedside, Grab bars, Lift chair, Shower chair     EXAMINATION/PRESENTATION/DECISION MAKING:   Critical Behavior:  Neurologic State: Alert  Orientation Level: Appropriate for age  Cognition: Follows commands  Safety/Judgement: Fall prevention  Hearing: Auditory  Auditory Impairment: None  Range Of Motion:  AROM: Generally decreased, functional (pt with RLE drop foot)           PROM:  (NT)           Strength:    Strength: Grossly decreased, non-functional                    Tone & Sensation:   Tone: Normal              Sensation:  (NT)               Coordination:  Coordination: Generally decreased, functional  Functional Mobility:  Bed Mobility:  Rolling: Maximum assistance; Additional time  Supine to Sit: Maximum assistance; Additional time;Assist x2  Sit to Supine: Moderate assistance; Additional time;Assist x2  Scooting: Maximum assistance; Additional time  Transfers:  Sit to Stand: Maximum assistance; Additional time;Assist x2  Stand to Sit: Moderate assistance; Additional time;Assist x2  Balance:   Sitting: Impaired; With support  Sitting - Static: Good (unsupported)  Sitting - Dynamic: Fair (occasional)  Standing: Impaired; With support  Standing - Static: Poor  Standing - Dynamic :  (NT)  Ambulation/Gait Training:  Gait Description (WDL): (Unable)  Therapeutic Exercises:   Pt encourage to complete APs for edema management and RLE strengthening      Functional Measure:  Tinetti test:      Sitting Balance: 0  Arises: 0  Attempts to Rise: 0  Immediate Standing Balance: 0  Standing Balance: 0  Nudged: 0  Eyes Closed: 0  Turn 360 Degrees - Continuous/Discontinuous: 0  Turn 360 Degrees - Steady/Unsteady: 0  Sitting Down: 0  Balance Score: 0  Indication of Gait: 0  R Step Length/Height: 0  L Step Length/Height: 0  R Foot Clearance: 0  L Foot Clearance: 0  Step Symmetry: 0  Step Continuity: 0  Path: 0  Trunk: 0  Walking Time: 0  Gait Score: 0  Total Score: 0         Tinetti Test and G-code impairment scale:  Percentage of Impairment CH     0%    CI     1-19% CJ     20-39% CK     40-59% CL     60-79% CM     80-99% CN      100%   Tinetti  Score 0-28 28 23-27 17-22 12-16 6-11 1-5 0          Tinetti Tool Score Risk of Falls  <19 = High Fall Risk  19-24 = Moderate Fall Risk  25-28 = Low Fall Risk  Tinetti ME. Performance-Oriented Assessment of Mobility Problems in Elderly Patients. [facility]; [id number]. (Scoring Description: PT Bulletin Feb. 10, 1993)     Older adults: Jeanne Sterling et al, 2009; n = 1000 [facility] elderly evaluated with ABC, ELDA, ADL, and IADL)  · Mean ELDA score for males aged 69-68 years = 26.21(3.40)  · Mean ELDA score for females age 69-68 years = 25.16(4.30)  · Mean ELDA score for males over 80 years = 23.29(6.02)  · Mean ELDA score for females over 80 years = 17.20(8.32)            G codes: In compliance with CMSs Claims Based Outcome Reporting, the following G-code set was chosen for this patient based on their primary functional limitation being treated: The outcome measure chosen to determine the severity of the functional limitation was the Tinetti with a score of 0/28 which was correlated with the impairment scale.       · Mobility - Walking and Moving Around:               - CURRENT STATUS:    CN - 100% impaired, limited or restricted               - GOAL STATUS:           CM - 80%-99% impaired, limited or restricted               - D/C STATUS:                       ---------------To be determined---------------   Pain:  Pain Scale 1: Adult Nonverbal Pain Scale  Pain Intensity 1: 0  Pain Location 1: Back  Pain Orientation 1: Mid  Pain Description 1: Sharp  Pain Intervention(s) 1: Medication (see MAR)  Activity Tolerance:   VSS supine to sit  Please refer to the flowsheet for vital signs taken during this treatment. After treatment:   [ ]         Patient left in no apparent distress sitting up in chair  [X]         Patient left in no apparent distress in bed  [X]         Call bell left within reach  [X]         Nursing notified  [X]         Caregiver present  [ ]         Bed alarm activated      COMMUNICATION/EDUCATION:   The patients plan of care was discussed with: Registered Nurse.  [X]         Fall prevention education was provided and the patient/caregiver indicated understanding. [X]         Patient/family have participated as able in goal setting and plan of care. [X]         Patient/family agree to work toward stated goals and plan of care. [ ]         Patient understands intent and goals of therapy, but is neutral about his/her participation. [ ]         Patient is unable to participate in goal setting and plan of care.      Thank you for this referral.  Diana Draper   Time Calculation: 14 mins

## 2017-08-02 NOTE — PROGRESS NOTES
Bedside shift change report given to Shayy (oncoming nurse) by Yessenia Alvarez (offgoing nurse).  Report included the following information SBAR, Kardex, Procedure Summary, Intake/Output, MAR, Accordion, Recent Results, Med Rec Status and Cardiac Rhythm SR.

## 2017-08-02 NOTE — DISCHARGE INSTRUCTIONS
After Hospital Care Plan:  Discharge Instructions Lumbar Fusion Surgery   DARIEN Sanchez PA-C    Patient Name Marilyn Reyes)  Date of procedure: 8/1/17  Procedure (Procedure(s):  T8-S1 THORACOLUMBAR  LAMINECTOMY WITH T6-L1 POSTERIOR SPINAL FUSION/ALLOGRAFT )  Surgeon (Surgeon(s) and Role:     * Crystal Katz MD - Primary)   PCP:    Follow up appointments  -follow up with Dr. Juanita Sen in 2 weeks. Call [phone], ext [phone] to make an appointment as soon as you get home from the hospital.    [facility] _________________________   phone: _______________________  The agency will contact you to arrange dates/times for visits. Please call them if you do not hear from them within 24 hours after you are discharged      When to call your Orthopaedic Surgeon:  -Signs of infection-if your incision is red; continues to have drainage; drainage has a foul odor or if you have a persistent fever over 101 degrees for 24 hours  -nausea or vomiting, severe headache  -loss of bowel or bladder function, inability to urinate  -changes in sensation in your arms or legs (numbness, tingling, loss of color)  -increased weakness-greater than before your surgery  -severe pain or pain not relieved by medications  -Signs of a blood clot in your leg-calf pain, tenderness, redness, swelling of lower leg  When to call your Primary Care Physician:  -Concerns about medical conditions such as diabetes, high blood pressure, asthma, congestive heart failure  -Call if blood sugars are elevated, persistent headache or dizziness, coughing or congestion, constipation or diarrhea, burning with urination, abnormal heart rate  When to call 911and go to the nearest emergency room  -acute onset of chest pain, shortness of breath, difficulty breathing    Activity  - Your only exercise should be walking.   Start with short frequent walks and increase your walking distance each day.  -Limit the amount of time you sit to 20-30 minute intervals. Sitting for prolonged periods of time will be uncomfortable for you following surgery.  -Do NOT lift anything over 5 pounds  -Do NOT do any straining, twisting or bending  -When you are in bed, you may lay on your back or on either side. Do NOT lie on your stomach    Brace - if ordered by surgeon   -If you have a back brace, you should wear your brace at all times when you are out of bed. Do not wear the brace while in bed or showering.  -Remember to always wear a cotton t-shirt underneath your brace.  -Do not bend or twist when your brace is off      Driving  -You may not drive or return to work until instructed by your physician. However, you may ride in the car for short periods of time. Incision Care  Your incision has been closed with absorbable sutures and the Dermabond Prineo skin closure system. This is a combination of a mesh and a liquid adhesive that will assist with healing. The mesh is to remain on your incision for 2 weeks. A dry dressing (ABD and tape) will be placed over it and should be changed daily. Please make sure the person performing your dressing changes washes their hands before touching the dressing. You may take brief showers but do not run the water directly onto the wound. After your shower, remove your dressing and blot your incision dry with a clean, soft towel and replace the dry dressing. Do not allow the tape to come in contact with the mesh. Do not rub or apply any lotions or ointments to your incision site. Do not soak or scrub your wound. The mesh dressing will be removed during your two week follow-up appointment. If you experience drainage leaking from underneath the mesh or if it peels off before 2 weeks, please contact your orthopedic surgeons office.     Showering  -You may shower in approximately 4 days after your surgery.      -Leave the dressing on during your shower without allowing the water to run over it.    -Reminder- your brace can be removed while showering. Remember to not bend or twist while your brace is off.      -Do not take a tub bath. Preventing blood clots  -You have been given T.E.D. stockings to wear. Continue to wear these for 7 days after your discharge. Put them on in the morning and take them off at night.      -They are used to increase your circulation and prevent blood clots from forming in your legs    Pain management  -take pain medication as prescribed; decrease the amount you use as your pain lessens  -avoid alcoholic beverages while taking pain medication  -avoid NSAIDS (Aleve, Ibuprofen, Aspirin) until your surgeon approves it  -Please be aware that many medications contain Tylenol. We do not want you to over medicate so please read the information below as a guide. Do not take more than 4 Grams of Tylenol in a 24 hour period. (There are 1000 milligrams in one Gram)  Percocet contains 325 mg of Tylenol per tablet (do not take more than 12 tablets in 24 hours)  Lortab contains 500 mg of Tylenol per tablet (do not take more than 8 tablets in 24 hours)  Norco contains 325 mg of Tylenol per tablet (do not take more than 12 tablets in 24 hours). Diet  -resume usual diet; drink plenty of fluids; eat foods high in fiber  -It is important to have regular bowel movements. Pain medications may cause constipation. You may want to take a stool softener (such as Senokot-S or Colace) to prevent constipation. If constipation occurs, take a laxative (such as Dulcolax tablets, Milk of Magnesia, or a suppository).   Laxatives should only be used if the above preventable measures have failed and you still have not had a bowel movement after three days

## 2017-08-02 NOTE — PROGRESS NOTES
Noted pt's transfer to [place] [address]. Pt had surgery yesterday and PT therapy is recommending acute rehab. MD placed consult for [facility] MD and per their consult, pt is accepted to [facility] pending medical stability and readiness for transfer. Unit CM will follow and assist with plans. Dea Sears MSW      3pm  Spoke with pt at the bedside and reviewed above. He confirmed his preference is [facility], [facility].     NATO Pressley

## 2017-08-02 NOTE — PROGRESS NOTES
ORTHOPAEDIC LUMBAR FUSION PROGRESS NOTE    NAME: Kristine Calvillo   :  1935   MRN:  [id number]   DATE:  2017    POD: 1 Day Post-Op  S/P: Procedure(s):  T8-S1 THORACOLUMBAR  LAMINECTOMY WITH T6-L1 POSTERIOR SPINAL FUSION/ALLOGRAFT     SUBJECTIVE:    Patient is lying with head elevated complaining of back pain along surgical incision. No new leg pain or numbness. No nausea/vomiting. Patch applied and tetracaine drops placed in right eye by anesthesia. OBJECTIVE:  Recent Labs      17   0316  17   1140   HGB  8.4*   --    INR   --   1.1   NA  137   --    K  4.7   --    CL  107   --    CO2  26   --    BUN  32*   --    CREA  1.36*   --    GLU  132*   --      Patient Vitals for the past 12 hrs:   BP Temp Pulse Resp SpO2   17 0700 (!) 95/39 99.6 °F (37.6 °C) 83 20 100 %   17 0323 110/49 - - - -   17 0307 94/50 (!) 100.5 °F (38.1 °C) 79 13 100 %   17 0100 107/49 (!) 100.7 °F (38.2 °C) 93 24 100 %   17 0000 117/57 (!) 101.3 °F (38.5 °C) 91 21 99 %   17 2100 (!) 130/100 97.9 °F (36.6 °C) 100 15 100 %       EXAM:  Positive strength/ROM bilat lower ext. Persistent weakness in right dorsiflexion. Neuro intact    PLAN:  D/C PCA, change to PO pain medications as needed  PT/OT, OOB. [address] [facility] for [place]. Patient may work with PT without brace. Bowel regimen. Slowly advance diet as tolerated. Ice packs to back for PRN pain  VTE prophylaxis:  SCDs and TEDs  Continue to monitor temp. Encourage use of ICS. Remove Valladares once OOB ambulating with PT  Remove drain later this afternoon or tomorrow pending output  Acute blood loss anemia postoperatively expected   Hgb 8.4. Hypotensive overnight. Drain output 100cc.    Will transfuse 2 units PRBCs   Recheck Hgb tomorrow AM  Rehab consult [facility] evaluation  Remain on Neuro Unit today      Saundra Oviedo, [address]

## 2017-08-03 LAB
ABO + RH BLD: NORMAL
ANION GAP BLD CALC-SCNC: 6 MMOL/L (ref 5–15)
BLD PROD TYP BPU: NORMAL
BLD PROD TYP BPU: NORMAL
BLOOD GROUP ANTIBODIES SERPL: NORMAL
BPU ID: NORMAL
BPU ID: NORMAL
BUN SERPL-MCNC: 30 MG/DL (ref 6–20)
BUN/CREAT SERPL: 26 (ref 12–20)
CALCIUM SERPL-MCNC: 7.8 MG/DL (ref 8.5–10.1)
CHLORIDE SERPL-SCNC: 106 MMOL/L (ref 97–108)
CO2 SERPL-SCNC: 24 MMOL/L (ref 21–32)
CREAT SERPL-MCNC: 1.15 MG/DL (ref 0.7–1.3)
CROSSMATCH RESULT,%XM: NORMAL
CROSSMATCH RESULT,%XM: NORMAL
GLUCOSE BLD STRIP.AUTO-MCNC: 132 MG/DL (ref 65–100)
GLUCOSE BLD STRIP.AUTO-MCNC: 172 MG/DL (ref 65–100)
GLUCOSE BLD STRIP.AUTO-MCNC: 181 MG/DL (ref 65–100)
GLUCOSE BLD STRIP.AUTO-MCNC: 182 MG/DL (ref 65–100)
GLUCOSE BLD STRIP.AUTO-MCNC: 199 MG/DL (ref 65–100)
GLUCOSE SERPL-MCNC: 135 MG/DL (ref 65–100)
HGB BLD-MCNC: 8.6 G/DL (ref 12.1–17)
POTASSIUM SERPL-SCNC: 4.6 MMOL/L (ref 3.5–5.1)
SERVICE CMNT-IMP: ABNORMAL
SODIUM SERPL-SCNC: 136 MMOL/L (ref 136–145)
SPECIMEN EXP DATE BLD: NORMAL
STATUS OF UNIT,%ST: NORMAL
STATUS OF UNIT,%ST: NORMAL
UNIT DIVISION, %UDIV: 0
UNIT DIVISION, %UDIV: 0

## 2017-08-03 PROCEDURE — 30233N1 TRANSFUSION OF NONAUTOLOGOUS RED BLOOD CELLS INTO PERIPHERAL VEIN, PERCUTANEOUS APPROACH: ICD-10-PCS | Performed by: ORTHOPAEDIC SURGERY

## 2017-08-03 PROCEDURE — 82962 GLUCOSE BLOOD TEST: CPT

## 2017-08-03 PROCEDURE — 97530 THERAPEUTIC ACTIVITIES: CPT

## 2017-08-03 PROCEDURE — 74011250637 HC RX REV CODE- 250/637: Performed by: PHYSICIAN ASSISTANT

## 2017-08-03 PROCEDURE — 74011636637 HC RX REV CODE- 636/637: Performed by: FAMILY MEDICINE

## 2017-08-03 PROCEDURE — 36415 COLL VENOUS BLD VENIPUNCTURE: CPT | Performed by: PHYSICIAN ASSISTANT

## 2017-08-03 PROCEDURE — 74011250637 HC RX REV CODE- 250/637: Performed by: HOSPITALIST

## 2017-08-03 PROCEDURE — 74011250637 HC RX REV CODE- 250/637: Performed by: NURSE PRACTITIONER

## 2017-08-03 PROCEDURE — 65270000029 HC RM PRIVATE

## 2017-08-03 PROCEDURE — 80048 BASIC METABOLIC PNL TOTAL CA: CPT | Performed by: PHYSICIAN ASSISTANT

## 2017-08-03 PROCEDURE — 85018 HEMOGLOBIN: CPT | Performed by: PHYSICIAN ASSISTANT

## 2017-08-03 PROCEDURE — 97116 GAIT TRAINING THERAPY: CPT

## 2017-08-03 PROCEDURE — 74011250637 HC RX REV CODE- 250/637: Performed by: FAMILY MEDICINE

## 2017-08-03 RX ORDER — DOCUSATE SODIUM 283 MG/5ML
1 LIQUID RECTAL
Status: DISCONTINUED | OUTPATIENT
Start: 2017-08-03 | End: 2017-08-04 | Stop reason: HOSPADM

## 2017-08-03 RX ORDER — MAGNESIUM CITRATE
296 SOLUTION, ORAL ORAL
Status: COMPLETED | OUTPATIENT
Start: 2017-08-03 | End: 2017-08-03

## 2017-08-03 RX ORDER — GABAPENTIN 300 MG/1
300 CAPSULE ORAL 3 TIMES DAILY
Status: DISCONTINUED | OUTPATIENT
Start: 2017-08-03 | End: 2017-08-04 | Stop reason: HOSPADM

## 2017-08-03 RX ORDER — ADHESIVE BANDAGE
30 BANDAGE TOPICAL DAILY PRN
Status: DISCONTINUED | OUTPATIENT
Start: 2017-08-03 | End: 2017-08-04 | Stop reason: HOSPADM

## 2017-08-03 RX ADMIN — Medication 10 ML: at 16:29

## 2017-08-03 RX ADMIN — INSULIN LISPRO 2 UNITS: 100 INJECTION, SOLUTION INTRAVENOUS; SUBCUTANEOUS at 13:33

## 2017-08-03 RX ADMIN — Medication 10 ML: at 06:30

## 2017-08-03 RX ADMIN — Medication 10 ML: at 09:54

## 2017-08-03 RX ADMIN — METOPROLOL TARTRATE 12.5 MG: 25 TABLET ORAL at 21:21

## 2017-08-03 RX ADMIN — ACETAMINOPHEN 650 MG: 325 TABLET, FILM COATED ORAL at 09:00

## 2017-08-03 RX ADMIN — GABAPENTIN 100 MG: 100 CAPSULE ORAL at 08:53

## 2017-08-03 RX ADMIN — DOCUSATE SODIUM AND SENNOSIDES 1 TABLET: 8.6; 5 TABLET, FILM COATED ORAL at 08:53

## 2017-08-03 RX ADMIN — MAGESIUM CITRATE 296 ML: 1.75 LIQUID ORAL at 17:37

## 2017-08-03 RX ADMIN — METOPROLOL TARTRATE 12.5 MG: 25 TABLET ORAL at 08:54

## 2017-08-03 RX ADMIN — GABAPENTIN 300 MG: 300 CAPSULE ORAL at 21:20

## 2017-08-03 RX ADMIN — Medication 10 ML: at 21:25

## 2017-08-03 RX ADMIN — Medication 10 ML: at 21:26

## 2017-08-03 RX ADMIN — GABAPENTIN 300 MG: 300 CAPSULE ORAL at 16:26

## 2017-08-03 RX ADMIN — ACETAMINOPHEN 650 MG: 325 TABLET, FILM COATED ORAL at 21:20

## 2017-08-03 RX ADMIN — ACETAMINOPHEN 650 MG: 325 TABLET, FILM COATED ORAL at 16:26

## 2017-08-03 RX ADMIN — OXYCODONE HYDROCHLORIDE 10 MG: 5 TABLET ORAL at 13:20

## 2017-08-03 RX ADMIN — GABAPENTIN 300 MG: 300 CAPSULE ORAL at 13:20

## 2017-08-03 RX ADMIN — OXYCODONE HYDROCHLORIDE 5 MG: 5 TABLET ORAL at 16:39

## 2017-08-03 RX ADMIN — Medication 10 ML: at 06:29

## 2017-08-03 RX ADMIN — INSULIN LISPRO 2 UNITS: 100 INJECTION, SOLUTION INTRAVENOUS; SUBCUTANEOUS at 17:38

## 2017-08-03 RX ADMIN — DOCUSATE SODIUM AND SENNOSIDES 1 TABLET: 8.6; 5 TABLET, FILM COATED ORAL at 17:37

## 2017-08-03 RX ADMIN — THERA TABS 1 TABLET: TAB at 08:53

## 2017-08-03 NOTE — PROGRESS NOTES
TRANSFER - IN REPORT:    Verbal report received from Jocelyn(name) on Michelle Costello  being received from [facility](unit) for routine post - op      Report consisted of patients Situation, Background, Assessment and   Recommendations(SBAR). Information from the following report(s) SBAR was reviewed with the receiving nurse. Opportunity for questions and clarification was provided. Assessment completed upon patients arrival to unit and care assumed.

## 2017-08-03 NOTE — PROGRESS NOTES
physical Therapy TREATMENT  Patient: Kemi Tellez (46 y.o. male)  Date: 8/3/2017  Diagnosis: Intractable pain  THORACIC AND LUMBAR STENOSIS Intractable pain  Procedure(s) (LRB):  T8-S1 THORACOLUMBAR  LAMINECTOMY WITH T6-L1 POSTERIOR SPINAL FUSION/ALLOGRAFT  (N/A) 2 Days Post-Op  Precautions: Spinal, Fall  Chart, physical therapy assessment, plan of care and goals were reviewed. ASSESSMENT:  Chart reviewed and clearance received from nursing for treatment, therapy notified that pt had been fitted for TLSO,but still had not arrived. Pt received supine in bed and agreeable to work with therapy. Prior to bed mobility, pt able to recall all 3 spinal precautions with minimal cueing. Pt able to move from supine<>sit with ModAx1 for assist with shoulders. Seated EOB pt stating increased back pain, 7/10. Seated EOB prior to activity, pt /54 with HR 84 bpms. Pt performed sit<>stand with ModAx2 and was able to ambulate with HHA and MaxAx2 for 10 ft. Pt maintains a significant forward flexed posture during ambulation secondary to pain and required additional time for advancement of bilateral LEs. Pt returned to bed and reporting dizziness, BP was 113/51 and HR 99. Pt positioned supine in bed, again requiring ModAx1 for guidance of shoulders and assist with LEs. Pt positioned to comfort with all needs in reach. Nursing notified of pt's status. Recommend pt complete inpatient rehab at discharge when medically stable. Progression toward goals:  []      Improving appropriately and progressing toward goals  []      Improving slowly and progressing toward goals  []      Not making progress toward goals and plan of care will be adjusted     PLAN:  Patient continues to benefit from skilled intervention to address the above impairments. Continue treatment per established plan of care.   Discharge Recommendations:  Inpatient Rehab  Further Equipment Recommendations for Discharge:  TBD     SUBJECTIVE:   Patient stated I was walking with a walker at home before this.     OBJECTIVE DATA SUMMARY:   Critical Behavior:  Neurologic State: Alert  Orientation Level: Oriented X4  Cognition: Follows commands  Safety/Judgement: Fall prevention  Functional Mobility Training:  Bed Mobility:  Rolling: Moderate assistance;Assist x1;Additional time  Supine to Sit: Moderate assistance;Assist x1;Additional time  Sit to Supine: Moderate assistance;Assist x1            Transfers:  Sit to Stand: Moderate assistance;Assist x2  Stand to Sit: Moderate assistance;Assist x2                             Balance:  Sitting: Impaired; With support  Sitting - Static: Good (unsupported)  Sitting - Dynamic: Fair (occasional)  Standing: Impaired; With support  Standing - Static: Poor  Standing - Dynamic : Poor  Ambulation/Gait Training:  Distance (ft): 10 Feet (ft)  Assistive Device: Gait belt (HHA with MaxAx2)  Ambulation - Level of Assistance: Maximum assistance;Assist x2; Additional time        Gait Abnormalities: Antalgic;Decreased step clearance        Base of Support: Widened     Speed/Porsche: Slow  Step Length: Left shortened;Right shortened                             Pain:  Pain Scale 1: Numeric (0 - 10)  Pain Intensity 1: 5  Pain Location 1: Back;Leg;Foot  Pain Orientation 1: Lower  Pain Description 1: Sharp  Pain Intervention(s) 1: Medication (see MAR)  Activity Tolerance:   Poor  Please refer to the flowsheet for vital signs taken during this treatment. After treatment:   [] Patient left in no apparent distress sitting up in chair  [x] Patient left in no apparent distress in bed  [x] Call bell left within reach  [x] Nursing notified  [] Caregiver present  [] Bed alarm activated    COMMUNICATION/COLLABORATION:   The patients plan of care was discussed with: Registered Nurse    Toro Corporal   Time Calculation: 23 mins   Regarding student involvement in patient care:  A student participated in this treatment session.  Per CMS Medicare statements and APTA guidelines I certify that the following was true:  1. I was present and directly observed the entire session. 2. I made all skilled judgments and clinical decisions regarding care. 3. I am the practitioner responsible for assessment, treatment, and documentation.   STAR Benton

## 2017-08-03 NOTE — PROGRESS NOTES
Per Guevara Hammond at [facility], patient has been accepted to [facility]. Bed is expected to be available tomorrow, 8/5/17.     Moustapha Santiago RN

## 2017-08-03 NOTE — PROGRESS NOTES
ORTHOPAEDIC LUMBAR FUSION PROGRESS NOTE    NAME: Payam Sim   :  1935   MRN:  [id number]   DATE:  8/3/2017    POD: 2 Days Post-Op  S/P: Procedure(s):  T8-S1 THORACOLUMBAR  LAMINECTOMY WITH T6-L1 POSTERIOR SPINAL FUSION/ALLOGRAFT     SUBJECTIVE:    Patient is lying with head elevated and appears well. Nurses at the bedside. Continues to have leg pain. He now has improvement in the movement of the right foot. No nausea/vomiting. Afebrile/VSS. Valladares and drain in place. Hypotensive. He worked with physical therapy at the bedside yesterday. Recent Labs      17   0617   1140   HGB  8.6*  9.5*   < >   --    HCT   --   28.4*   --    --    INR   --    --    --   1.1   NA  136  137   < >   --    K  4.6  4.5   < >   --    CL  106  104   < >   --    CO2  24  26   < >   --    BUN  30*  31*   < >   --    CREA  1.15  1.39*   < >   --    GLU  135*  143*   < >   --     < > = values in this interval not displayed. Patient Vitals for the past 12 hrs:   BP Temp Pulse Resp SpO2 Weight   17 0300 95/42 98.2 °F (36.8 °C) 83 18 99 % 88.5 kg (195 lb)   17 0000 - - - - 98 % -   17 2300 99/53 98.2 °F (36.8 °C) 78 20 99 % -   17 2100 98/40 - 80 - - -   17 106/48 98.7 °F (37.1 °C) 75 19 99 % -   17 1930 90/56 98.3 °F (36.8 °C) 78 18 100 % -       EXAM:  Moving bilateral lower extremities. Movement in the feet is present, but not pronounced    PLAN:  PO pain medications as needed  PT/OT, OOB. Patient may work with PT without brace. Bowel regimen. Slowly advance diet as tolerated. Ice packs to back for PRN pain  VTE prophylaxis:  SCDs and TEDs  Encourage use of ICS. Remove Valladares once OOB ambulating with PT  Remove drain once output decreases  Acute blood loss anemia postoperatively expected                        Hgb 8.6 (8.4 yesterday)  Hypotensive overnight. Drain output 125cc.                         May transfuse 2 units PRBCs if hypotension continues                        Recheck Hgb tomorrow AM  Hold Lisinopril  Transfer to [address]  Discharge to rehab pending    Bashir Mauricio PA-C

## 2017-08-03 NOTE — PROGRESS NOTES
Bedside and Verbal shift change report given to Myke Juarez RN (oncoming nurse) by Lindle Primrose, RN (offgoing nurse). Report included the following information SBAR, Kardex, Procedure Summary, Intake/Output, MAR, Recent Results and Cardiac Rhythm NSR.

## 2017-08-03 NOTE — PROGRESS NOTES
Physical Therapy    Attempted to treat pt [id number] Initially pt was eating lunch, and then report elevated pain requesting pain medicine prior to treatment. On 3rd trial pt is sitting on BSC reporting needing to have a BM, and unable to mobilize due to abdominal pain. Deferred further attempts for treatment. Continue to recommend inpt rehab aat discharge.  Will follow up tomorrow

## 2017-08-03 NOTE — INTERDISCIPLINARY ROUNDS
IDR/SLIDR Summary          Patient: Obey Leon MRN: [id number]    Age: [de-identified] y.o. YOB: 1935 Room/Bed: [facility]   Admit Diagnosis: Intractable pain  THORACIC AND LUMBAR STENOSIS  Principal Diagnosis: Intractable pain   Goals: TSLO brace, PT/OT  Readmission: NO  Quality Measure: Not applicable  VTE Prophylaxis: Mechanical  Influenza Vaccine screening completed? YES  Pneumococcal Vaccine screening completed? YES  Mobility needs: Yes   Nutrition plan:No  Consults:P.T, O.T. and Case Management    Financial concerns:No  Escalated to CM? YES  RRAT Score: 16   Interventions:  Testing due for pt today?  NO  LOS: 7 days Expected length of stay 3-4 days  Discharge plan: Rehab   PCP: Floridalma Ruby MD  Transportation needs: Yes    Days before discharge:two or more days before discharge   Discharge disposition: Rehab    Signed:     Tamica Godfrey  8/3/17  1002

## 2017-08-03 NOTE — PROGRESS NOTES
Spiritual Care Assessment/Progress Notes    Yue Nick [id number]  [id number]    1935  [de-identified] y.o.  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    Total knee replacement status 10/22/2013    Diabetes mellitus ([facility] [address] 01/06/2013    Lumbago     Arthritis     Hypertension     Spinal stenosis 05/15/2012        Date: 8/3/2017       Level of Catholic/Spiritual Activity:  [x]         Involved in dc tradition/spiritual practice    []         Not involved in dc tradition/spiritual practice  []         Spiritually oriented    []         Claims no spiritual orientation    []         seeking spiritual identity  []         Feels alienated from Mosque practice/tradition  []         Feels angry about Mosque practice/tradition  [x]         Spirituality/Mosque tradition is a resource for coping at this time.   []         Not able to assess due to medical condition    Services Provided Today:  []         crisis intervention    []         reading Scriptures  [x]         spiritual assessment    []         prayer  [x]         empathic listening/emotional support  []         rites and rituals (cite in comments)  []         life review     []         Mosque support  []         theological development   []         advocacy  []         ethical dialog     []         blessing  []         bereavement support    []         support to family  []         anticipatory grief support   []         help with AMD  []         spiritual guidance    []         meditation      Spiritual Care Needs  [] Emotional Support  []         Spiritual/Spiritism Care  []         Loss/Adjustment  []         Advocacy/Referral                /Ethics  [x]         No needs expressed at               this time  []         Other: (note in               comments)  Spiritual Care Plan  []         Follow up visits with               pt/family  []         Provide materials  []         Schedule sacraments  []         Contact Community               Clergy  [x]         Follow up as needed  []         Other: (note in               comments)     Comments: Visited with patient per request of patient's . Provided active listening as patient shared life review and stories from his career as a . Explored emotions related to patient's limited mobility due to back issues and his hopes for regained independence following his surgery. Patient's dc brings him comfort and strength and he believes that \"with the doctor and God\" he will find relief from his pain and limitations. Assured patient of ongoing prayers and advised of  availability as needed or desired. Chaplain Maria Fernanda Moeller M.Div.    Paging Service [phone])

## 2017-08-03 NOTE — PROGRESS NOTES
8:15 PM  Pt blood pressure is much improved. Pt seems to be orthostatic/postural hypotension. The first BP 88/53 was taken after pt had been sitting up awhile for dinner. The second was after the pt Head of Bed had been lowered for several minutes, and the current BP was taken after pt had been resting flat/ HOB less than 10 degrees for about 45 minutes. The entire time pt was asymptomatic, resting, A&O x4, vitals stable and feeling \"fine\".        08/02/17 2015   Vitals   Temp 98.7 °F (37.1 °C)   Temp Source Oral   Pulse (Heart Rate) 75   Heart Rate Source Monitor   Resp Rate 19   O2 Sat (%) 99 %   Level of Consciousness Alert   /48   MAP (Calculated) 67   BP Patient Position At rest;Head of bed elevated (Comment degrees)  (flat)   Cardiac Rhythm NSR   MEWS Score 1

## 2017-08-03 NOTE — PROGRESS NOTES
PM & R Progress Note    NAME: Mian Akbar   :  1935   MRN:  [id number]   DATE: 8/3/2017    Treatment Team: Attending Provider: Severo German, MD; Consulting Provider: Zoë Landa MD; Utilization Review: John Jerez RN; Consulting Provider: Lindsey Abdi, BEKAH; Care Manager: Hilary Stiles BSW; Consulting Provider: Gian Marcial MD         Assessment :    Plan:  1. 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 Right footdrop secondary to #1  3. Neuropathic pain/Allodynia secondary to #1  4. Acute blood loss anemia, s/p 2u PRBC  5. Acute post-op pain on chronic back pain  6. Hypocalcemia  7. Renal insufficiency, may be pre-renal from volume contraction, improved  8. Constipation  9. Hypoalbuminemia  10. DM type II, controlled  11. Tissue AVR  12. Essential HTN 1) Continue physical and occupational therapy. Once medically stable,  He will benefit from acute inpatient rehabilitation. 2) DVT prophylaxis: SCDs, sana hose, mobilization. 3) Bowel program: Continue current bowel regimen. D/w NP - enema today  4) Pain management:Neuropathic - continue Gabapentin and adjust to effective dose; Somatic -  continue sked APAP Oxycodone, prn cryotx.   5) start prosource TID  Discussed with NP. Subjective:     Back pain 6/10, decreased with meds. Constipation, since 4 days pre-op, no flatus. Working with therapies. -199 past 24h. Received IVF.     Current Facility-Administered Medications   Medication Dose Route Frequency    gabapentin (NEURONTIN) capsule 300 mg  300 mg Oral TID    HYDROmorphone (PF) (DILAUDID) injection 1-2 mg  1-2 mg IntraVENous Q4H PRN    0.9% sodium chloride infusion 250 mL  250 mL IntraVENous PRN    sodium chloride (NS) flush 5-10 mL  5-10 mL IntraVENous Q8H    sodium chloride (NS) flush 5-10 mL  5-10 mL IntraVENous PRN    sodium chloride (NS) flush 5-10 mL 5-10 mL IntraVENous Q8H    sodium chloride (NS) flush 5-10 mL  5-10 mL IntraVENous PRN    naloxone (NARCAN) injection 0.4 mg  0.4 mg IntraVENous PRN    senna-docusate (PERICOLACE) 8.6-50 mg per tablet 1 Tab  1 Tab Oral BID    bisacodyl (DULCOLAX) suppository 10 mg  10 mg Rectal DAILY PRN    acetaminophen (TYLENOL) tablet 650 mg  650 mg Oral Q6H    oxyCODONE IR (ROXICODONE) tablet 5 mg  5 mg Oral Q3H PRN    oxyCODONE IR (ROXICODONE) tablet 10 mg  10 mg Oral Q3H PRN    ondansetron (ZOFRAN) injection 4 mg  4 mg IntraVENous Q4H PRN    diphenhydrAMINE (BENADRYL) injection 12.5 mg  12.5 mg IntraVENous Q6H PRN    lisinopril (PRINIVIL, ZESTRIL) tablet 5 mg  5 mg Oral DAILY    coenzyme Q-10 (CO Q-10) capsule 200 mg  200 mg Oral DAILY    magnesium oxide tab 500 mg  500 mg Oral DAILY    sodium chloride (NS) flush 5-10 mL  5-10 mL IntraVENous Q8H    sodium chloride (NS) flush 5-10 mL  5-10 mL IntraVENous PRN    therapeutic multivitamin (THERAGRAN) tablet 1 Tab  1 Tab Oral DAILY    glucose chewable tablet 16 g  4 Tab Oral PRN    glucagon (GLUCAGEN) injection 1 mg  1 mg IntraMUSCular PRN    insulin lispro (HUMALOG) injection   SubCUTAneous AC&HS    dextrose 10 % infusion 125-250 mL  125-250 mL IntraVENous PRN    metoprolol tartrate (LOPRESSOR) tablet 12.5 mg  12.5 mg Oral Q12H       Objective:     VITALS:   Last 24hrs VS reviewed since prior progress note. Most recent are:  Visit Vitals    /68 (BP 1 Location: Left arm, BP Patient Position: At rest)    Pulse 76    Temp 98.1 °F (36.7 °C)    Resp 22    Ht 5' 10\" (1.778 m)    Wt 88.5 kg (195 lb)    SpO2 98%    BMI 27.98 kg/m2       PHYSICAL EXAM:  General: Alert, cooperative, not in acute distress    Skin:  Spine Incision - with dressing, c/d/i, hemovac  Resp:  CTA Bilaterally. No Wheezing/Rhonchi/Rales. CV:  Regular  rhythm,  No murmur, No Rubs, No Gallops.  No edema  GI:  Soft, nontender, distended, sl tympanitic, scant bowel sounds  Musculoskeletal:  Calves soft, nontender, no BLE edema  Neurologic:  Hypersthesia to LT stim in b/l mid-leg to both feet    Motor: 5/5 except right DF 1/5, left DF 3+/5  Psych:   Oriented x3, Not anxious or agitated. Functional:    ADLs  Feeding, grooming: Independent    Bathing: Maximum assistance    Upper Body Dressing: Moderate assistance    Lower Body Dressing: Total assistance    Toileting: Total assistance    Bed Mobility Training  Rolling: Minimum assistance, Adaptive equipment  Supine to Sit: Moderate assistance, Assist x1, Additional time  Sit to Supine: Moderate assistance, Assist x1  Scooting: Maximum assistance, Additional time,  Transfer Training  Sit to Stand: Moderate assistance, Assist x2  Stand to Sit: Moderate assistance, Assist x2,  ,  Gait Training  Assistive Device: Gait belt (HHA with MaxAx2)  Ambulation - Level of Assistance: Maximum assistance, Assist x2, Additional time  Distance (ft): 10 Feet (ft),   ,       LABS:  Recent Labs      08/03/17 0622 08/02/17 2014 08/02/17 0316   WBC   --   13.5*   --    HGB  8.6*  9.5*  8.4*   HCT   --   28.4*   --    PLT   --   136*   --      Recent Labs      08/03/17 0622 08/02/17 2014 08/02/17 0316 08/01/17   1140   NA  136  137  137   --    K  4.6  4.5  4.7   --    CL  106  104  107   --    CO2  24  26  26   --    GLU  135*  143*  132*   --    BUN  30*  31*  32*   --    CREA  1.15  1.39*  1.36*   --    CA  7.8*  7.6*  7.3*   --    ALB   --   2.4*   --    --    TBILI   --   0.5   --    --    SGOT   --   23   --    --    ALT   --   13   --    --    INR   --    --    --   1.1       IMAGING: No new studies.       Tammi Sneed MD  8/3/2017

## 2017-08-03 NOTE — PROGRESS NOTES
NUTRITION- DIETETIC tECHnICIAN    Pt seen for:       []                  Rescreen  []                  Food preferences/tolerances  []                  Food Allergies  []                  PO intake check  []                  Supplements  []                  Diet order clarification  []                  Education  []                  Other     Rescreen:    [x]                  Not at Nutrition Risk, rescreen per screening protocol  []                  At Nutrition Risk- RD referral         SUBJECTIVE/OBJECTIVE:     Information obtained from:  patient      Diet:  GI Lite    Intake: good    Patient Vitals for the past 100 hrs:   % Diet Eaten   07/31/17 1739 100 %       Weight Changes: Wt Readings from Last 3 Encounters:   08/03/17 88.5 kg (195 lb)   07/27/17 83 kg (183 lb)   06/28/17 86.2 kg (190 lb)     Problems Identified:      [x]                  Diet advanced from Full Liquid to GI Lite this morning. Explained menu ordering system as patient just moved to new room.    []                  Specified food preferences   []                  Dislikes supplements              []                  Allergies:   []                  Difficulty chewing      []                  Dentition    []                  Nausea/Vomiting   []                  Constipation   []                  Diarrhea    PLAN:     [x]                   Continue current diet and encourage intake  []                   Obtained/adjusted food preferences/tolerances and/or snacks options   []                   Dislikes supplements will try a substitution  []                   Modify diet for food allergies  []                   Adjust texture due to difficulty chewing   []                   Educated patient  []                   RD Referral  [x]                   Rescreen per screening protocol          Joel Arteaga DTR

## 2017-08-03 NOTE — PROGRESS NOTES
TRANSFER - OUT REPORT:    Verbal report given to Rosalina Gaytan RN(name) on Josefina Jaquez  being transferred to  Spine(unit) for routine progression of care       Report consisted of patients Situation, Background, Assessment and   Recommendations(SBAR). Information from the following report(s) SBAR, Kardex, Intake/Output, MAR, Recent Results and Cardiac Rhythm NSR was reviewed with the receiving nurse. Lines:   Quad Lumen 08/01/17 Right Internal jugular (Active)   Central Line Being Utilized Yes 8/3/2017  8:00 AM   Criteria for Appropriate Use Limited/no vessel suitable for conventional peripheral access 8/3/2017  8:00 AM   Site Assessment Clean, dry, & intact 8/3/2017  8:00 AM   Infiltration Assessment 0 8/3/2017  8:00 AM   Affected Extremity/Extremities Color distal to insertion site pink (or appropriate for race); Pulses palpable;Range of motion performed 8/3/2017  8:00 AM   Date of Last Dressing Change 08/02/17 8/3/2017  8:00 AM   Dressing Status Clean, dry, & intact 8/3/2017  8:00 AM   Dressing Type Disk with Chlorhexadine gluconate (CHG); Transparent 8/3/2017  8:00 AM   Action Taken Open ports on tubing capped 8/3/2017  8:00 AM   Proximal Hub Color/Line Status White;Flushed;Capped 8/3/2017  8:00 AM   Positive Blood Return (Medial Site) Yes 8/3/2017  8:00 AM   Medial 1 Hub Color/Line Status Blue;Capped;Flushed 8/3/2017  8:00 AM   Positive Blood Return (Lateral Site) Yes 8/3/2017  8:00 AM   Medial 2 Hub Color/Line Status Gray;Flushed;Capped 8/3/2017  8:00 AM   Positive Blood Return (Site #3) Yes 8/3/2017  8:00 AM   Distal Hub Color/Line Status Brown;Flushed;Capped 8/3/2017  8:00 AM   Positive Blood Return (Site #4) Yes 8/3/2017  8:00 AM   Alcohol Cap Used Yes 8/3/2017  8:00 AM       Peripheral IV 07/27/17 Left Forearm (Active)   Site Assessment Clean, dry, & intact 8/3/2017  8:00 AM   Phlebitis Assessment 0 8/3/2017  8:00 AM   Infiltration Assessment 0 8/3/2017  8:00 AM   Dressing Status Clean, dry, & intact 8/3/2017  8:00 AM   Dressing Type Transparent 8/3/2017  8:00 AM   Hub Color/Line Status Capped 8/3/2017  8:00 AM   Action Taken Open ports on tubing capped 8/3/2017  8:00 AM   Alcohol Cap Used Yes 8/3/2017  8:00 AM        Opportunity for questions and clarification was provided. Patient transported with:   Tech      Pt's daughter made aware.

## 2017-08-03 NOTE — PROGRESS NOTES
Problem: Self Care Deficits Care Plan (Adult)  Goal: *Acute Goals and Plan of Care (Insert Text)  Occupational Therapy Goals  Initiated 8/2/2017    1. Patient will perform lower body dressing with moderate assistance using reacher, sock aide, elastic shoe laces, long handled shoe horns PRN within 7 days. 2. Patient will perform toilet transfer with minimal assistance/contact guard assist using most appropriate DME within 7 days. 3. Patient will upper body dressing at minimal assistance/contact guard assist within 7 days. 4. Patient will don/doff back brace at minimal assistance/contact guard assist within 7 days. 5. Patient will verbalize/demonstrate 3/3 back precautions during ADL tasks without cues within 7 days. OCCUPATIONAL THERAPY TREATMENT  Patient: Muriel Acosta (75 y.o. male)  Date: 8/3/2017  Diagnosis: Intractable pain  THORACIC AND LUMBAR STENOSIS Intractable pain  Procedure(s) (LRB):  T8-S1 THORACOLUMBAR  LAMINECTOMY WITH T6-L1 POSTERIOR SPINAL FUSION/ALLOGRAFT  (N/A) 2 Days Post-Op  Precautions: Spinal, Fall      ASSESSMENT:  Patient received supine in bed following PT session, cleared for therapy by nursing. Patient fatigued but agreeable to maximizing independence with bed mobility and decreasing pain. Patient requiring Mod A to bring hips into alignment for proper log roll technique upon initial attempt. Patient then able to complete four more rolls to each side and steadily increasing to requiring only one tactile cue at hips and reporting significant improvement in pain with transfer. Patient encouraged to complete hip flexion exercises while supine in bed in order to increase ability to maintain tailor sit and complete lower body dressing with increased independence. Patient progressing well. Continue to recommend inpatient rehab when patient medically stable.    Progression toward goals:  [X]       Improving appropriately and progressing toward goals  [ ]       Improving slowly and progressing toward goals  [ ]       Not making progress toward goals and plan of care will be adjusted       PLAN:  Patient continues to benefit from skilled intervention to address the above impairments. Continue treatment per established plan of care. Discharge Recommendations:  Inpatient Rehab  Further Equipment Recommendations for Discharge:  TBD       SUBJECTIVE:   Patient stated That does make it feel a bit better.       OBJECTIVE DATA SUMMARY:   Cognitive/Behavioral Status:  Neurologic State: Alert  Orientation Level: Oriented X4  Cognition: Appropriate for age attention/concentration; Follows commands  Perception: Appears intact  Perseveration: No perseveration noted  Safety/Judgement: Fall prevention; Awareness of environment; Insight into deficits     Functional Mobility and Transfers for ADLs:  Bed Mobility:  Rolling: Minimum assistance; Adaptive equipment  Supine to Sit: Moderate assistance;Assist x1;Additional time  Sit to Supine: Moderate assistance;Assist x1     Transfers:  Sit to Stand: Moderate assistance;Assist x2  Functional Transfers  Toilet Transfer : Moderate assistance;Assist x2     Balance:  Sitting: Impaired; With support  Sitting - Static: Good (unsupported)  Sitting - Dynamic: Fair (occasional)  Standing: Impaired; With support  Standing - Static: Poor  Standing - Dynamic : Poor     ADL Intervention:     Lower Body Dressing Assistance  Socks: Compensatory technique training     Cognitive Retraining  Safety/Judgement: Fall prevention; Awareness of environment; Insight into deficits     Pain:  Pain Scale 1: Numeric (0 - 10)  Pain Intensity 1: 8  Pain Location 1: Back;Leg  Pain Orientation 1: Lower  Pain Description 1: Sharp  Pain Intervention(s) 1: Medication (see MAR)  Activity Tolerance:   Good. Please refer to the flowsheet for vital signs taken during this treatment.   After treatment:   [ ] Patient left in no apparent distress sitting up in chair  [X] Patient left in no apparent distress in bed  [X] Call bell left within reach  [X] Nursing notified  [ ] Caregiver present  [ ] Bed alarm activated      COMMUNICATION/COLLABORATION:   The patients plan of care was discussed with: Physical Therapist and Registered Nurse     Brandi Lee OT  Time Calculation: 15 mins

## 2017-08-04 ENCOUNTER — HOSPITAL ENCOUNTER (OUTPATIENT)
Age: 82
Discharge: HOME | End: 2017-08-26
Attending: PHYSICAL MEDICINE & REHABILITATION | Admitting: PHYSICAL MEDICINE & REHABILITATION

## 2017-08-04 VITALS
WEIGHT: 195 LBS | RESPIRATION RATE: 16 BRPM | OXYGEN SATURATION: 95 % | TEMPERATURE: 98.5 F | HEIGHT: 70 IN | HEART RATE: 76 BPM | SYSTOLIC BLOOD PRESSURE: 121 MMHG | BODY MASS INDEX: 27.92 KG/M2 | DIASTOLIC BLOOD PRESSURE: 71 MMHG

## 2017-08-04 LAB
APPEARANCE UR: ABNORMAL
BACTERIA URNS QL MICRO: ABNORMAL /HPF
BILIRUB UR QL: NEGATIVE
COLOR UR: ABNORMAL
EPITH CASTS URNS QL MICRO: ABNORMAL /LPF
GLUCOSE BLD STRIP.AUTO-MCNC: 163 MG/DL (ref 65–100)
GLUCOSE BLD STRIP.AUTO-MCNC: 168 MG/DL (ref 65–100)
GLUCOSE UR STRIP.AUTO-MCNC: NEGATIVE MG/DL
GRAN CASTS URNS QL MICRO: ABNORMAL /LPF
HGB BLD-MCNC: 9.4 G/DL (ref 12.1–17)
HGB UR QL STRIP: ABNORMAL
HYALINE CASTS URNS QL MICRO: ABNORMAL /LPF (ref 0–5)
KETONES UR QL STRIP.AUTO: NEGATIVE MG/DL
LEUKOCYTE ESTERASE UR QL STRIP.AUTO: ABNORMAL
NITRITE UR QL STRIP.AUTO: NEGATIVE
PH UR STRIP: 5 [PH] (ref 5–8)
PROT UR STRIP-MCNC: 30 MG/DL
RBC #/AREA URNS HPF: ABNORMAL /HPF (ref 0–5)
SERVICE CMNT-IMP: ABNORMAL
SERVICE CMNT-IMP: ABNORMAL
SP GR UR REFRACTOMETRY: 1.02 (ref 1–1.03)
UROBILINOGEN UR QL STRIP.AUTO: 0.2 EU/DL (ref 0.2–1)
WBC URNS QL MICRO: ABNORMAL /HPF (ref 0–4)

## 2017-08-04 PROCEDURE — 82962 GLUCOSE BLOOD TEST: CPT

## 2017-08-04 PROCEDURE — 81001 URINALYSIS AUTO W/SCOPE: CPT | Performed by: PHYSICAL MEDICINE & REHABILITATION

## 2017-08-04 PROCEDURE — 36415 COLL VENOUS BLD VENIPUNCTURE: CPT | Performed by: PHYSICIAN ASSISTANT

## 2017-08-04 PROCEDURE — 97535 SELF CARE MNGMENT TRAINING: CPT

## 2017-08-04 PROCEDURE — 77030027138 HC INCENT SPIROMETER -A

## 2017-08-04 PROCEDURE — 74011636637 HC RX REV CODE- 636/637: Performed by: FAMILY MEDICINE

## 2017-08-04 PROCEDURE — 85018 HEMOGLOBIN: CPT | Performed by: PHYSICIAN ASSISTANT

## 2017-08-04 PROCEDURE — 74011250637 HC RX REV CODE- 250/637: Performed by: PHYSICIAN ASSISTANT

## 2017-08-04 PROCEDURE — 74011250637 HC RX REV CODE- 250/637: Performed by: FAMILY MEDICINE

## 2017-08-04 PROCEDURE — 87086 URINE CULTURE/COLONY COUNT: CPT | Performed by: PHYSICAL MEDICINE & REHABILITATION

## 2017-08-04 PROCEDURE — 97530 THERAPEUTIC ACTIVITIES: CPT

## 2017-08-04 PROCEDURE — 74011250637 HC RX REV CODE- 250/637: Performed by: PHYSICAL MEDICINE & REHABILITATION

## 2017-08-04 PROCEDURE — 97116 GAIT TRAINING THERAPY: CPT

## 2017-08-04 RX ORDER — LISINOPRIL 5 MG/1
5 TABLET ORAL DAILY
Status: DISCONTINUED | OUTPATIENT
Start: 2017-08-05 | End: 2017-08-26 | Stop reason: HOSPADM

## 2017-08-04 RX ORDER — LEVOFLOXACIN 250 MG/1
250 TABLET ORAL
Status: DISCONTINUED | OUTPATIENT
Start: 2017-08-05 | End: 2017-08-07

## 2017-08-04 RX ORDER — LANOLIN ALCOHOL/MO/W.PET/CERES
400 CREAM (GRAM) TOPICAL DAILY
Status: DISCONTINUED | OUTPATIENT
Start: 2017-08-05 | End: 2017-08-12

## 2017-08-04 RX ORDER — OXYCODONE HYDROCHLORIDE 5 MG/1
5-10 TABLET ORAL
Qty: 60 TAB | Refills: 0 | Status: SHIPPED | OUTPATIENT
Start: 2017-08-04

## 2017-08-04 RX ORDER — UBIDECARENONE 50 MG
200 CAPSULE ORAL DAILY
Status: DISCONTINUED | OUTPATIENT
Start: 2017-08-05 | End: 2017-08-26 | Stop reason: HOSPADM

## 2017-08-04 RX ORDER — METOPROLOL TARTRATE 25 MG/1
12.5 TABLET, FILM COATED ORAL EVERY 12 HOURS
Status: DISCONTINUED | OUTPATIENT
Start: 2017-08-04 | End: 2017-08-17 | Stop reason: SDUPTHER

## 2017-08-04 RX ORDER — ZOLPIDEM TARTRATE 5 MG/1
5 TABLET ORAL
Status: DISCONTINUED | OUTPATIENT
Start: 2017-08-04 | End: 2017-08-05

## 2017-08-04 RX ORDER — ADHESIVE BANDAGE
30 BANDAGE TOPICAL DAILY PRN
Status: DISCONTINUED | OUTPATIENT
Start: 2017-08-04 | End: 2017-08-26 | Stop reason: HOSPADM

## 2017-08-04 RX ORDER — MAGNESIUM SULFATE 100 %
16 CRYSTALS MISCELLANEOUS AS NEEDED
Status: DISCONTINUED | OUTPATIENT
Start: 2017-08-04 | End: 2017-08-26 | Stop reason: HOSPADM

## 2017-08-04 RX ORDER — ACETAMINOPHEN 325 MG/1
650 TABLET ORAL
Status: DISCONTINUED | OUTPATIENT
Start: 2017-08-04 | End: 2017-08-26 | Stop reason: HOSPADM

## 2017-08-04 RX ORDER — AMOXICILLIN 250 MG
1 CAPSULE ORAL
Status: DISCONTINUED | OUTPATIENT
Start: 2017-08-04 | End: 2017-08-12

## 2017-08-04 RX ORDER — OXYCODONE HYDROCHLORIDE 5 MG/1
10 TABLET ORAL
Status: DISCONTINUED | OUTPATIENT
Start: 2017-08-04 | End: 2017-08-05

## 2017-08-04 RX ORDER — THERA TABS 400 MCG
1 TAB ORAL DAILY
Status: DISCONTINUED | OUTPATIENT
Start: 2017-08-05 | End: 2017-08-26 | Stop reason: HOSPADM

## 2017-08-04 RX ORDER — INSULIN LISPRO 100 [IU]/ML
INJECTION, SOLUTION INTRAVENOUS; SUBCUTANEOUS
Status: DISCONTINUED | OUTPATIENT
Start: 2017-08-04 | End: 2017-08-18

## 2017-08-04 RX ORDER — OXYCODONE HYDROCHLORIDE 5 MG/1
5 TABLET ORAL
Status: DISCONTINUED | OUTPATIENT
Start: 2017-08-04 | End: 2017-08-05

## 2017-08-04 RX ORDER — GABAPENTIN 300 MG/1
300 CAPSULE ORAL 3 TIMES DAILY
Status: DISCONTINUED | OUTPATIENT
Start: 2017-08-04 | End: 2017-08-05

## 2017-08-04 RX ORDER — ONDANSETRON 4 MG/1
4 TABLET, ORALLY DISINTEGRATING ORAL
Status: DISCONTINUED | OUTPATIENT
Start: 2017-08-04 | End: 2017-08-26 | Stop reason: HOSPADM

## 2017-08-04 RX ORDER — FACIAL-BODY WIPES
10 EACH TOPICAL DAILY PRN
Status: DISCONTINUED | OUTPATIENT
Start: 2017-08-04 | End: 2017-08-26 | Stop reason: HOSPADM

## 2017-08-04 RX ORDER — BACITRACIN 500 [USP'U]/G
OINTMENT TOPICAL ONCE
Status: COMPLETED | OUTPATIENT
Start: 2017-08-04 | End: 2017-08-04

## 2017-08-04 RX ADMIN — BACITRACIN: 500 OINTMENT TOPICAL at 13:00

## 2017-08-04 RX ADMIN — GABAPENTIN 300 MG: 300 CAPSULE ORAL at 08:46

## 2017-08-04 RX ADMIN — Medication 500 MG: at 08:47

## 2017-08-04 RX ADMIN — DOCUSATE SODIUM AND SENNOSIDES 1 TABLET: 8.6; 5 TABLET, FILM COATED ORAL at 21:23

## 2017-08-04 RX ADMIN — METOPROLOL TARTRATE 12.5 MG: 25 TABLET ORAL at 21:22

## 2017-08-04 RX ADMIN — ACETAMINOPHEN 650 MG: 325 TABLET, FILM COATED ORAL at 11:21

## 2017-08-04 RX ADMIN — ACETAMINOPHEN 650 MG: 325 TABLET, FILM COATED ORAL at 08:46

## 2017-08-04 RX ADMIN — INSULIN LISPRO 2 UNITS: 100 INJECTION, SOLUTION INTRAVENOUS; SUBCUTANEOUS at 07:10

## 2017-08-04 RX ADMIN — ACETAMINOPHEN 650 MG: 325 TABLET, FILM COATED ORAL at 15:54

## 2017-08-04 RX ADMIN — OXYCODONE HYDROCHLORIDE 10 MG: 5 TABLET ORAL at 21:22

## 2017-08-04 RX ADMIN — INSULIN LISPRO 2 UNITS: 100 INJECTION, SOLUTION INTRAVENOUS; SUBCUTANEOUS at 11:40

## 2017-08-04 RX ADMIN — GABAPENTIN 300 MG: 300 CAPSULE ORAL at 15:54

## 2017-08-04 RX ADMIN — GABAPENTIN 300 MG: 300 CAPSULE ORAL at 21:23

## 2017-08-04 RX ADMIN — ACETAMINOPHEN 650 MG: 325 TABLET, FILM COATED ORAL at 04:14

## 2017-08-04 RX ADMIN — THERA TABS 1 TABLET: TAB at 08:46

## 2017-08-04 RX ADMIN — Medication 200 MG: at 08:46

## 2017-08-04 RX ADMIN — DOCUSATE SODIUM AND SENNOSIDES 1 TABLET: 8.6; 5 TABLET, FILM COATED ORAL at 08:46

## 2017-08-04 NOTE — PROGRESS NOTES
Cm informed that patient is ready for discharge today. Patient will be going to [facility]. Report can be called to [phone]. Transport set up with [facility] for 5 pm. EMTALA transfer.   [facility] , [facility]

## 2017-08-04 NOTE — PROGRESS NOTES
Problem: Self Care Deficits Care Plan (Adult)  Goal: *Acute Goals and Plan of Care (Insert Text)  Occupational Therapy Goals  Initiated 8/2/2017    1. Patient will perform lower body dressing with moderate assistance using reacher, sock aide, elastic shoe laces, long handled shoe horns PRN within 7 days. 2. Patient will perform toilet transfer with minimal assistance/contact guard assist using most appropriate DME within 7 days. 3. Patient will upper body dressing at minimal assistance/contact guard assist within 7 days. 4. Patient will don/doff back brace at minimal assistance/contact guard assist within 7 days. 5. Patient will verbalize/demonstrate 3/3 back precautions during ADL tasks without cues within 7 days. OCCUPATIONAL THERAPY TREATMENT  Patient: Elias Joyce (32 y.o. male)  Date: 8/4/2017  Diagnosis: Intractable pain  THORACIC AND LUMBAR STENOSIS Intractable pain  Procedure(s) (LRB):  T8-S1 THORACOLUMBAR  LAMINECTOMY WITH T6-L1 POSTERIOR SPINAL FUSION/ALLOGRAFT  (N/A) 3 Days Post-Op  Precautions: Spinal, Fall      ASSESSMENT:  Pt received seated on the [facility] agreeable to working with OT but requested to remain on BSC due to concern of having diarrhea. Reinforced sitting precautions (limiting sitting length of time to no more than 30 mins) and advised against sitting for longer than 10 mins at a time on the [facility] for skin protection. Pt reported he had been sitting on the [facility] for 15 mins at this point already. Pt continues to require MOD to MAX A X 2 for sit to stand from the [facility] and assist X 2 for dependent level hygiene (assist X 1 to maintain standing balance and 2nd person for hygiene). Extremely limited standing tolerance with assist X 2 needed to stay standing long enough to obtain BP however pt without drop in pressure from sitting to standing (although does c/o dizziness with position change).   Pt required a rest break after standing, then transferred to chair with Max A X1 and 2nd person to pull chair up behind pt due to fatigue/LE weakness. Pt left in chair with lunch tray and no further needs at this time. Recommend working on LB dressing next session. .    Progression toward goals:  [X]       Improving appropriately and progressing toward goals  [ ]       Improving slowly and progressing toward goals  [ ]       Not making progress toward goals and plan of care will be adjusted       PLAN:  Patient continues to benefit from skilled intervention to address the above impairments. Continue treatment per established plan of care. Discharge Recommendations:  Inpatient Rehab  Further Equipment Recommendations for Discharge:  TBD       SUBJECTIVE:   Patient stated I'm afraid to get off this BSC because I keep going to the bathroom. Can we do therapy here? Marcelle Cobian      OBJECTIVE DATA SUMMARY:   Cognitive/Behavioral Status:  Neurologic State: Alert; Appropriate for age  Orientation Level: Appropriate for age  Cognition: Appropriate decision making; Appropriate for age attention/concentration; Appropriate safety awareness; Follows commands  Perception: Appears intact  Perseveration: No perseveration noted  Safety/Judgement: Fall prevention     Functional Mobility and Transfers for ADLs:  Bed Mobility:  Rolling: Moderate assistance;Assist x2  Supine to Sit: Moderate assistance;Assist x2  Scooting: Stand-by asssistance     Transfers:  Sit to Stand: Moderate assistance;Assist x2  Functional Transfers  Toilet Transfer : Moderate assistance;Maximum assistance;Assist x2 (progresses to MAX A X 2 as pt fatigues)     Balance:  Sitting: Intact  Sitting - Static: Good (unsupported)  Standing: Impaired; With support  Standing - Static: Poor  Standing - Dynamic : Poor     ADL Intervention:  Feeding  Feeding Assistance: Independent     Toileting  Toileting Assistance: Total assistance(dependent) (assist X 2)  Bowel Hygiene:  Total assistance (dependent)  Clothing Management: Total assistance (dependent)  Adaptive Equipment: Walker     Cognitive Retraining  Safety/Judgement: Fall prevention     Pain:  Pain Scale 1: Numeric (0 - 10)  Pain Intensity 1: 5  Pain Location 1: Leg (Bilateral below the knee pain)     Pain Description 1: Tingling; Sharp  Pain Intervention(s) 1: Medication (see MAR)  Activity Tolerance:   Continues to report dizziness with position changes but no significant   Vitals:     08/04/17 0836 08/04/17 1213 08/04/17 1222 08/04/17 1226   BP: 92/58 96/54 136/63 125/59   BP 1 Location: Right arm         BP Patient Position: At rest Pre-activity; Sitting Standing Post activity; Sitting   Pulse: 84 96 (!) 103 95   Resp: 18         Temp: 98.5 °F (36.9 °C)         SpO2: 92%         Weight:           Height:             Please refer to the flowsheet for vital signs taken during this treatment.   After treatment:   [X] Patient left in no apparent distress sitting up in chair  [ ] Patient left in no apparent distress in bed  [X] Call bell left within reach  [X] Nursing notified  [ ] Caregiver present  [ ] Bed alarm activated      COMMUNICATION/COLLABORATION:   The patients plan of care was discussed with: Physical Therapist and Registered Nurse     Jed Severance, OT  Time Calculation: 24 mins

## 2017-08-04 NOTE — PROGRESS NOTES
[address]  [facility] Bundled Payment Handoff     FROM:                                TO: [facility]                                                      ([address] [place] [address] or Facility name)  [address]  [address] [address]  Dept: [address]: [phone]                                      Room#:  12/65                                                      Discharging Nurse: Alvina Barfield  Unit SV#:[id number]         SITUATION      ASAScore: ASA 3 - Patient with moderate systemic disease with functional limitations    Admitted:  7/27/2017  Hospital Day: 9      Attending Provider:  Lyndon Marshall MD     Consultations:  IP CONSULT TO ORTHOPEDIC SURGERY  IP CONSULT TO HOSPITALIST  IP CONSULT TO [facility] PHYSICIAN    PCP:  Chico Villarreal MD   [phone]     Admitting Dx: Intractable pain  THORACIC AND LUMBAR STENOSIS       Principal Problem:    Intractable pain (7/27/2017)      3 Days Post-Op of   Procedure(s):  T8-S1 THORACOLUMBAR  LAMINECTOMY WITH T6-L1 POSTERIOR SPINAL FUSION/ALLOGRAFT    BY: Lyndon Marshall MD             ON: 8/1/2017                  Code Status: Full Code             Advance Directive? Verified (Send w/patient)     Isolation:  There are currently no Active Isolations       MDRO: No current active infections    BACKGROUND     Allergies: Allergies   Allergen Reactions    Prednisone Drowsiness and Vertigo     \"loopy, confusion\"       Past Medical History:   Diagnosis Date    Arthritis     Osteo    Cancer ([address]     states precancer skin    DDD (degenerative disc disease), lumbar     Diabetes ([address]     TYPE II    Hypertension     controlled by meds.     Lumbago     Lumbar spinal stenosis     Other ill-defined conditions     S/P aortic valve replacement with porcine valve        Past Surgical History:   Procedure Laterality Date    CARDIAC SURG PROCEDURE UNLIST  2008    aortic valve replacement    HX APPENDECTOMY  1953    HX GI      colonoscopy x 2    HX ORTHOPAEDIC Bilateral     CARPAL TUNNEL    HX SHOULDER REPLACEMENT Right 2014    NEUROLOGICAL PROCEDURE UNLISTED      Dexter carpal tunnel release    TOTAL KNEE ARTHROPLASTY Right 1/2013       Prior to Admission Medications   Prescriptions Last Dose Informant Patient Reported? Taking? CALCIUM PO   Yes Yes   Sig: Take  by mouth daily. HYDROcodone-acetaminophen (NORCO) 5-325 mg per tablet   Yes Yes   Sig: Take 1 Tab by mouth every four (4) hours as needed for Pain. aspirin delayed-release 81 mg tablet  Self Yes Yes   Sig: Take 81 mg by mouth daily. coenzyme Q-10 (CO Q-10) 200 mg capsule  Self Yes Yes   Sig: Take 200 mg by mouth daily. glipiZIDE SR (GLUCOTROL XL) 5 mg CR tablet   Yes Yes   Sig: Take 5 mg by mouth daily. lisinopril (PRINIVIL, ZESTRIL) 20 mg tablet   Yes Yes   Sig: Take 20 mg by mouth daily. magnesium 250 mg Tab  Self Yes Yes   Sig: Take 250 mg by mouth daily. multivitamin (ONE A DAY) tablet  Self Yes Yes   Sig: Take 1 Tab by mouth daily. red yeast rice extract 600 mg cap  Self Yes Yes   Sig: Take 1,200 mg by mouth daily. traMADol (ULTRAM) 50 mg tablet   Yes Yes   Sig: Take 50 mg by mouth every six (6) hours as needed for Pain. Facility-Administered Medications: None       Vaccinations: There is no immunization history on file for this patient. ASSESSMENT   Age: [de-identified] y.o.              Gender: male        Height: Height: 5' 10\" (177.8 cm)                    Weight:Weight: 88.5 kg (195 lb)     Patient Vitals for the past 8 hrs:   Temp Pulse Resp BP SpO2   08/04/17 1538 98.5 °F (36.9 °C) 76 16 121/71 95 %   08/04/17 1226 - 95 - 125/59 -   08/04/17 1222 - (!) 103 - 136/63 -   08/04/17 1213 - 96 - 96/54 -            Active Orders   Diet    DIET GI LITE (POST SURGICAL)       Orientation: Orientation Level: Appropriate for age    Active Lines/Drains:  (Peg Tube / Valladares / CL or S/L?):yes    Urinary Status: Valladares      Last BM: Last Bowel Movement Date: 08/03/17     Skin Integrity: Incision (comment)   Wound Back-DRESSING STATUS: Clean, dry, and intact    Wound Back-DRESSING TYPE: Dry dressing    Mobility: Slightly limited   Weight Bearing Status: WBAT (Weight Bearing as Tolerated)      Gait Training  Assistive Device: Brace/Splint, Gait belt, Walker, rolling  Ambulation - Level of Assistance: Moderate assistance, Adaptive equipment, Assist x2  Distance (ft): 8 Feet (ft) (forward, to chair)     On Anticoagulation? NO      Pain Medications given:  Oxycodone 5mg                                 Last dose: 8/3/17 at  1639    Lab Results   Component Value Date/Time    Glucose 135 08/03/2017 06:22 AM    Hemoglobin A1c 5.9 07/27/2017 01:27 PM    INR 1.1 08/01/2017 11:40 AM    INR 1.0 07/31/2017 10:13 AM    HGB 9.4 08/04/2017 04:13 AM    HGB 8.6 08/03/2017 06:22 AM    HGB 9.5 08/02/2017 08:14 PM    HGB 8.4 08/02/2017 03:16 AM       Readmission Risks:  Score:         RECOMMENDATION     See After Visit Summary (AVS) for:  · Discharge instructions  · After [address]   · Medication Reconciliation          [facility] Orthopaedic Nurse Navigator  JOSE Denson, RN-BC       Office  [phone]  Cell      [phone]  Fax      [phone]  [email or web address]             . Elvira

## 2017-08-04 NOTE — PROGRESS NOTES
Paged on call for Dr. Leigh Ann Kee in regards to bp being low. Spoke with Dr. Lillian Drake about low BP and slight tachycardia. No new orders will follow up with primary.

## 2017-08-04 NOTE — PROGRESS NOTES
ORTHOPAEDIC LUMBAR FUSION PROGRESS NOTE    NAME: Brandan Albright   :  1935   MRN:  [id number]   DATE:  2017    POD: 3 Days Post-Op  S/P: Procedure(s):  T8-S1 THORACOLUMBAR  LAMINECTOMY WITH T6-L1 POSTERIOR SPINAL FUSION/ALLOGRAFT     SUBJECTIVE:    Patient is lying with head elevated and appears well. Leg pain has improved. He now has improvement in the movement of the right foot. No nausea/vomiting. Afebrile/VSS. Valladares and drain in place. Hypotensive. He did not work with physical therapy yesterday. Recent Labs      173  17   0622  17   1140   HGB  9.4*  8.6*  9.5*   < >   --    HCT   --    --   28.4*   --    --    INR   --    --    --    --   1.1   NA   --   136  137   < >   --    K   --   4.6  4.5   < >   --    CL   --   106  104   < >   --    CO2   --   24  26   < >   --    BUN   --   30*  31*   < >   --    CREA   --   1.15  1.39*   < >   --    GLU   --   135*  143*   < >   --     < > = values in this interval not displayed. Patient Vitals for the past 12 hrs:   BP Temp Pulse Resp SpO2   17 0415 94/58 - - - -   17 0359 (!) 75/43 - 100 - -   17 0318 (!) 88/54 - - - -   17 0302 (!) 88/50 98 °F (36.7 °C) 99 16 91 %   17 2120 104/51 - 94 - -   17 1925 100/52 98 °F (36.7 °C) 96 20 90 %     EXAM:  Moving bilateral lower extremities. Movement in the feet is present, but not pronounced     PLAN:  PO pain medications as needed  PT/OT, OOB. Patient may work with PT without brace. Bowel regimen.  Slowly advance diet as tolerated. Ice packs to back for PRN pain  VTE prophylaxis:  SCDs and TEDs  Encourage use of ICS  Remove Valladares once OOB ambulating with PT  Remove drain once output decreases  Acute blood loss anemia postoperatively expected                        Hgb 9.4 (8.6 yesterday)  Hypotensive overnight.  Drain output 150cc.                        Received 2 units PRBCs 17                        Recheck Hgb tomorrow AM  Hold Lisinopril and morning metoprolol.  If BP stable, continue to hold both meds  Discharge to rehab pending    Eladio Mendez PA-C

## 2017-08-04 NOTE — PROGRESS NOTES
Problem: Mobility Impaired (Adult and Pediatric)  Goal: *Acute Goals and Plan of Care (Insert Text)  Physical Therapy Goals  Initiated 8/2/2017    1. Patient will move from supine to sit and sit to supine in bed with minimal assistance/contact guard assist within 4 days. 2. Patient will perform sit to stand with moderate assistance within 4 days. 3. Patient will ambulate with maximal assistance for 10 feet with the least restrictive device within 4 days. 4. Patient will verbalize and demonstrate understanding of spinal precautions (No bending, lifting greater than 5 lbs, or twisting; log-roll technique; frequent repositioning as instructed) within 4 days. PHYSICAL THERAPY TREATMENT     Patient: Lizy Graham (57 y.o. male)  Date: 8/4/2017  Diagnosis: Intractable pain  THORACIC AND LUMBAR STENOSIS Intractable pain  Procedure(s) (LRB):  T8-S1 THORACOLUMBAR  LAMINECTOMY WITH T6-L1 POSTERIOR SPINAL FUSION/ALLOGRAFT  (N/A) 3 Days Post-Op  Precautions: Spinal, Fall  Chart, physical therapy assessment, plan of care and goals were reviewed. ASSESSMENT:  Pt received supine in bed; agreeable to PT. Pt continues to require physical assist (Mod Ax2) for bed mobility (w/log roll) and transfers; cues provided to ensure knees,hips, and shoulders move as one unit. Once sitting EOB, pt demonstrated intact static sitting. Pt required tactile cues/facilitation at gluts for upright standing (x2 from bed, then from chair). Pt unable to maintain upright posture as pt flexed at hips (required verbal and tactile cues w/ facilitation at gluts/sacrum for hip extension). Pt able to take steps over to chair (Mod Ax2, RW) for rest break. Pt attempted stand again, this time standing posture slightly improved (able to hold upright posture ~5-10 sec) but then returns to flexed posture at hips; also noted during both stands that pt unable to maintain knee extension. (pt reported problems and pain in left knee).  Pt attempted steps forward w/ chair follow but needed to sit (fatigue). Pt remained in chair w/ needs met, items in reach. Progression toward goals:  [ ]      Improving appropriately and progressing toward goals  [X]      Improving slowly and progressing toward goals  [ ]      Not making progress toward goals and plan of care will be adjusted       PLAN:  Patient continues to benefit from skilled intervention to address the above impairments. Continue treatment per established plan of care. Discharge Recommendations:  Inpatient Rehab    Further Equipment Recommendations for Discharge:  TBD at rehab       SUBJECTIVE:   Patient stated You stirred it up.  referring to back pain. The patient stated 3/3 back precautions. Reviewed all 3 with patient. OBJECTIVE DATA SUMMARY:   Critical Behavior:  Neurologic State: Alert  Orientation Level: Oriented X4  Cognition: Appropriate decision making, Follows commands  Safety/Judgement: Fall prevention, Awareness of environment, Insight into deficits  Functional Mobility Training:  Bed Mobility:  Log Rolling: Moderate assistance;Assist x2  Supine to Sit: Moderate assistance;Assist x2     Scooting: Stand-by asssistance                    Brace donned with  maximal assistance   Transfers:  Sit to Stand: Moderate assistance;Assist x2  Stand to Sit: Moderate assistance;Assist x2                             Balance:  Sitting: Intact  Standing: Impaired; With support  Ambulation/Gait Training:  Distance (ft): 8 Feet (ft) (forward, to chair)  Assistive Device: Brace/Splint;Gait belt;Walker, rolling  Ambulation - Level of Assistance: Moderate assistance; Adaptive equipment;Assist x2        Gait Abnormalities: Antalgic;Decreased step clearance;Shuffling gait; Step to gait        Base of Support: Widened  Stance: Left decreased  Speed/Porsche: Shuffled  Step Length: Left shortened;Right shortened                          Pain:  Pain Scale 1: Numeric (0 - 10)  Pain Intensity 1: 5  Pain Location 1: Leg (Bilateral below the knee pain)     Pain Description 1: Tingling; Sharp  Pain Intervention(s) 1: Medication (see MAR)  Activity Tolerance:   Limited. Pt w/o c/o orthostatic symptoms. Please refer to the flowsheet for vital signs taken during this treatment.      After treatment:   [X] Patient left in no apparent distress sitting up in chair  [ ] Patient left in no apparent distress in bed  [X] Call bell left within reach  [X] Nursing notified  [ ] Caregiver present  [ ] Bed alarm activated      COMMUNICATION/COLLABORATION:   The patients plan of care was discussed with: Registered Nurse Ismael/Ralph Bolanos PTA   Time Calculation: 18 mins

## 2017-08-05 LAB
25(OH)D3 SERPL-MCNC: 22.8 NG/ML (ref 30–100)
ALBUMIN SERPL BCP-MCNC: 2.2 G/DL (ref 3.5–5)
ALBUMIN/GLOB SERPL: 0.6 {RATIO} (ref 1.1–2.2)
ALP SERPL-CCNC: 76 U/L (ref 45–117)
ALT SERPL-CCNC: 12 U/L (ref 12–78)
ANION GAP BLD CALC-SCNC: 8 MMOL/L (ref 5–15)
AST SERPL W P-5'-P-CCNC: 21 U/L (ref 15–37)
BILIRUB SERPL-MCNC: 0.4 MG/DL (ref 0.2–1)
BUN SERPL-MCNC: 51 MG/DL (ref 6–20)
BUN/CREAT SERPL: 32 (ref 12–20)
CALCIUM SERPL-MCNC: 8 MG/DL (ref 8.5–10.1)
CHLORIDE SERPL-SCNC: 95 MMOL/L (ref 97–108)
CO2 SERPL-SCNC: 27 MMOL/L (ref 21–32)
CREAT SERPL-MCNC: 1.61 MG/DL (ref 0.7–1.3)
ERYTHROCYTE [DISTWIDTH] IN BLOOD BY AUTOMATED COUNT: 13 % (ref 11.5–14.5)
GLOBULIN SER CALC-MCNC: 3.6 G/DL (ref 2–4)
GLUCOSE SERPL-MCNC: 151 MG/DL (ref 65–100)
HCT VFR BLD AUTO: 27 % (ref 36.6–50.3)
HGB BLD-MCNC: 9.3 G/DL (ref 12.1–17)
MAGNESIUM SERPL-MCNC: 2.9 MG/DL (ref 1.6–2.4)
MCH RBC QN AUTO: 31.3 PG (ref 26–34)
MCHC RBC AUTO-ENTMCNC: 34.4 G/DL (ref 30–36.5)
MCV RBC AUTO: 90.9 FL (ref 80–99)
PLATELET # BLD AUTO: 171 K/UL (ref 150–400)
POTASSIUM SERPL-SCNC: 4.2 MMOL/L (ref 3.5–5.1)
PROT SERPL-MCNC: 5.8 G/DL (ref 6.4–8.2)
RBC # BLD AUTO: 2.97 M/UL (ref 4.1–5.7)
SODIUM SERPL-SCNC: 130 MMOL/L (ref 136–145)
WBC # BLD AUTO: 9.8 K/UL (ref 4.1–11.1)

## 2017-08-05 PROCEDURE — 74011000250 HC RX REV CODE- 250: Performed by: PHYSICAL MEDICINE & REHABILITATION

## 2017-08-05 PROCEDURE — 82306 VITAMIN D 25 HYDROXY: CPT | Performed by: PHYSICAL MEDICINE & REHABILITATION

## 2017-08-05 PROCEDURE — 74011636637 HC RX REV CODE- 636/637: Performed by: PHYSICAL MEDICINE & REHABILITATION

## 2017-08-05 PROCEDURE — 85027 COMPLETE CBC AUTOMATED: CPT | Performed by: PHYSICAL MEDICINE & REHABILITATION

## 2017-08-05 PROCEDURE — 74011250637 HC RX REV CODE- 250/637: Performed by: PHYSICAL MEDICINE & REHABILITATION

## 2017-08-05 PROCEDURE — 80053 COMPREHEN METABOLIC PANEL: CPT | Performed by: PHYSICAL MEDICINE & REHABILITATION

## 2017-08-05 PROCEDURE — 83735 ASSAY OF MAGNESIUM: CPT | Performed by: PHYSICAL MEDICINE & REHABILITATION

## 2017-08-05 PROCEDURE — 36415 COLL VENOUS BLD VENIPUNCTURE: CPT | Performed by: PHYSICAL MEDICINE & REHABILITATION

## 2017-08-05 RX ORDER — DIPHENHYDRAMINE HCL 25 MG
25 CAPSULE ORAL
Status: DISCONTINUED | OUTPATIENT
Start: 2017-08-05 | End: 2017-08-07

## 2017-08-05 RX ORDER — IPRATROPIUM BROMIDE AND ALBUTEROL SULFATE 2.5; .5 MG/3ML; MG/3ML
3 SOLUTION RESPIRATORY (INHALATION)
Status: DISCONTINUED | OUTPATIENT
Start: 2017-08-05 | End: 2017-08-26 | Stop reason: HOSPADM

## 2017-08-05 RX ORDER — LORAZEPAM 2 MG/ML
1 INJECTION INTRAMUSCULAR ONCE
Status: ACTIVE | OUTPATIENT
Start: 2017-08-06 | End: 2017-08-06

## 2017-08-05 RX ORDER — IPRATROPIUM BROMIDE AND ALBUTEROL SULFATE 2.5; .5 MG/3ML; MG/3ML
3 SOLUTION RESPIRATORY (INHALATION)
Status: COMPLETED | OUTPATIENT
Start: 2017-08-05 | End: 2017-08-05

## 2017-08-05 RX ADMIN — GABAPENTIN 300 MG: 300 CAPSULE ORAL at 13:00

## 2017-08-05 RX ADMIN — METOPROLOL TARTRATE 12.5 MG: 25 TABLET ORAL at 20:42

## 2017-08-05 RX ADMIN — LISINOPRIL 5 MG: 5 TABLET ORAL at 08:43

## 2017-08-05 RX ADMIN — OXYCODONE HYDROCHLORIDE 5 MG: 5 TABLET ORAL at 05:16

## 2017-08-05 RX ADMIN — LEVOFLOXACIN 250 MG: 250 TABLET, FILM COATED ORAL at 08:44

## 2017-08-05 RX ADMIN — Medication 200 MG: at 12:58

## 2017-08-05 RX ADMIN — Medication 400 MG: at 12:58

## 2017-08-05 RX ADMIN — LEVOFLOXACIN 250 MG: 250 TABLET, FILM COATED ORAL at 00:17

## 2017-08-05 RX ADMIN — GABAPENTIN 300 MG: 300 CAPSULE ORAL at 05:16

## 2017-08-05 RX ADMIN — THERA TABS 1 TABLET: TAB at 08:43

## 2017-08-05 RX ADMIN — INSULIN LISPRO 2 UNITS: 100 INJECTION, SOLUTION INTRAVENOUS; SUBCUTANEOUS at 08:44

## 2017-08-05 RX ADMIN — ACETAMINOPHEN 650 MG: 325 TABLET, FILM COATED ORAL at 20:40

## 2017-08-05 RX ADMIN — METOPROLOL TARTRATE 12.5 MG: 25 TABLET ORAL at 08:43

## 2017-08-05 RX ADMIN — IPRATROPIUM BROMIDE AND ALBUTEROL SULFATE 3 ML: .5; 3 SOLUTION RESPIRATORY (INHALATION) at 17:26

## 2017-08-05 RX ADMIN — INSULIN LISPRO 2 UNITS: 100 INJECTION, SOLUTION INTRAVENOUS; SUBCUTANEOUS at 12:58

## 2017-08-05 RX ADMIN — DOCUSATE SODIUM AND SENNOSIDES 1 TABLET: 8.6; 5 TABLET, FILM COATED ORAL at 20:42

## 2017-08-05 RX ADMIN — OXYCODONE HYDROCHLORIDE 5 MG: 5 TABLET ORAL at 08:45

## 2017-08-05 RX ADMIN — DIPHENHYDRAMINE HYDROCHLORIDE 25 MG: 25 CAPSULE ORAL at 20:43

## 2017-08-06 LAB
ANION GAP BLD CALC-SCNC: 10 MMOL/L (ref 5–15)
BACTERIA SPEC CULT: NORMAL
BUN SERPL-MCNC: 65 MG/DL (ref 6–20)
BUN/CREAT SERPL: 43 (ref 12–20)
CALCIUM SERPL-MCNC: 8.1 MG/DL (ref 8.5–10.1)
CC UR VC: NORMAL
CHLORIDE SERPL-SCNC: 95 MMOL/L (ref 97–108)
CO2 SERPL-SCNC: 25 MMOL/L (ref 21–32)
CREAT SERPL-MCNC: 1.5 MG/DL (ref 0.7–1.3)
GLUCOSE SERPL-MCNC: 163 MG/DL (ref 65–100)
POTASSIUM SERPL-SCNC: 4.1 MMOL/L (ref 3.5–5.1)
SERVICE CMNT-IMP: NORMAL
SODIUM SERPL-SCNC: 130 MMOL/L (ref 136–145)

## 2017-08-06 PROCEDURE — 80048 BASIC METABOLIC PNL TOTAL CA: CPT | Performed by: PHYSICAL MEDICINE & REHABILITATION

## 2017-08-06 PROCEDURE — 74011250637 HC RX REV CODE- 250/637: Performed by: PHYSICAL MEDICINE & REHABILITATION

## 2017-08-06 PROCEDURE — 74011000258 HC RX REV CODE- 258: Performed by: PHYSICAL MEDICINE & REHABILITATION

## 2017-08-06 PROCEDURE — 74011636637 HC RX REV CODE- 636/637: Performed by: PHYSICAL MEDICINE & REHABILITATION

## 2017-08-06 PROCEDURE — 36415 COLL VENOUS BLD VENIPUNCTURE: CPT | Performed by: PHYSICAL MEDICINE & REHABILITATION

## 2017-08-06 RX ORDER — SODIUM CHLORIDE 450 MG/100ML
60 INJECTION, SOLUTION INTRAVENOUS DAILY
Status: DISCONTINUED | OUTPATIENT
Start: 2017-08-06 | End: 2017-08-07

## 2017-08-06 RX ADMIN — Medication 400 MG: at 08:21

## 2017-08-06 RX ADMIN — METOPROLOL TARTRATE 12.5 MG: 25 TABLET ORAL at 22:18

## 2017-08-06 RX ADMIN — ACETAMINOPHEN 650 MG: 325 TABLET, FILM COATED ORAL at 22:32

## 2017-08-06 RX ADMIN — INSULIN LISPRO 2 UNITS: 100 INJECTION, SOLUTION INTRAVENOUS; SUBCUTANEOUS at 22:16

## 2017-08-06 RX ADMIN — Medication 200 MG: at 08:21

## 2017-08-06 RX ADMIN — DOCUSATE SODIUM AND SENNOSIDES 1 TABLET: 8.6; 5 TABLET, FILM COATED ORAL at 22:19

## 2017-08-06 RX ADMIN — INSULIN LISPRO 2 UNITS: 100 INJECTION, SOLUTION INTRAVENOUS; SUBCUTANEOUS at 08:21

## 2017-08-06 RX ADMIN — LEVOFLOXACIN 250 MG: 250 TABLET, FILM COATED ORAL at 08:21

## 2017-08-06 RX ADMIN — SODIUM CHLORIDE 60 ML/HR: 4.5 INJECTION, SOLUTION INTRAVENOUS at 22:20

## 2017-08-06 RX ADMIN — INSULIN LISPRO 4 UNITS: 100 INJECTION, SOLUTION INTRAVENOUS; SUBCUTANEOUS at 12:25

## 2017-08-06 RX ADMIN — INSULIN LISPRO 2 UNITS: 100 INJECTION, SOLUTION INTRAVENOUS; SUBCUTANEOUS at 16:49

## 2017-08-06 RX ADMIN — THERA TABS 1 TABLET: TAB at 08:21

## 2017-08-06 RX ADMIN — METOPROLOL TARTRATE 12.5 MG: 25 TABLET ORAL at 08:22

## 2017-08-06 RX ADMIN — LISINOPRIL 5 MG: 5 TABLET ORAL at 08:21

## 2017-08-06 RX ADMIN — ACETAMINOPHEN 650 MG: 325 TABLET, FILM COATED ORAL at 03:57

## 2017-08-07 LAB
ANION GAP BLD CALC-SCNC: 10 MMOL/L (ref 5–15)
ANION GAP BLD CALC-SCNC: 10 MMOL/L (ref 5–15)
ANION GAP BLD CALC-SCNC: 8 MMOL/L (ref 5–15)
BASOPHILS # BLD AUTO: 0 K/UL
BASOPHILS # BLD AUTO: 0 K/UL (ref 0–0.1)
BASOPHILS # BLD: 0 %
BASOPHILS # BLD: 0 % (ref 0–1)
BUN SERPL-MCNC: 62 MG/DL (ref 6–20)
BUN SERPL-MCNC: 65 MG/DL (ref 6–20)
BUN SERPL-MCNC: 66 MG/DL (ref 6–20)
BUN/CREAT SERPL: 46 (ref 12–20)
BUN/CREAT SERPL: 47 (ref 12–20)
BUN/CREAT SERPL: 52 (ref 12–20)
CALCIUM SERPL-MCNC: 8.5 MG/DL (ref 8.5–10.1)
CALCIUM SERPL-MCNC: 8.6 MG/DL (ref 8.5–10.1)
CALCIUM SERPL-MCNC: 8.7 MG/DL (ref 8.5–10.1)
CHLORIDE SERPL-SCNC: 101 MMOL/L (ref 97–108)
CHLORIDE SERPL-SCNC: 97 MMOL/L (ref 97–108)
CHLORIDE SERPL-SCNC: 98 MMOL/L (ref 97–108)
CO2 SERPL-SCNC: 24 MMOL/L (ref 21–32)
CO2 SERPL-SCNC: 24 MMOL/L (ref 21–32)
CO2 SERPL-SCNC: 25 MMOL/L (ref 21–32)
CREAT SERPL-MCNC: 1.27 MG/DL (ref 0.7–1.3)
CREAT SERPL-MCNC: 1.36 MG/DL (ref 0.7–1.3)
CREAT SERPL-MCNC: 1.37 MG/DL (ref 0.7–1.3)
CRP SERPL-MCNC: 18 MG/DL (ref 0–0.6)
DIFFERENTIAL METHOD BLD: ABNORMAL
EOSINOPHIL # BLD: 0 K/UL (ref 0–0.4)
EOSINOPHIL # BLD: 0.2 K/UL
EOSINOPHIL NFR BLD: 0 % (ref 0–7)
EOSINOPHIL NFR BLD: 1 %
ERYTHROCYTE [DISTWIDTH] IN BLOOD BY AUTOMATED COUNT: 12.8 % (ref 11.5–14.5)
ERYTHROCYTE [DISTWIDTH] IN BLOOD BY AUTOMATED COUNT: 13 % (ref 11.5–14.5)
ERYTHROCYTE [SEDIMENTATION RATE] IN BLOOD: 73 MM/HR (ref 0–20)
GLUCOSE SERPL-MCNC: 109 MG/DL (ref 65–100)
GLUCOSE SERPL-MCNC: 176 MG/DL (ref 65–100)
GLUCOSE SERPL-MCNC: 186 MG/DL (ref 65–100)
HCT VFR BLD AUTO: 27.2 % (ref 36.6–50.3)
HCT VFR BLD AUTO: 27.4 % (ref 36.6–50.3)
HGB BLD-MCNC: 9.2 G/DL (ref 12.1–17)
HGB BLD-MCNC: 9.6 G/DL (ref 12.1–17)
LYMPHOCYTES # BLD AUTO: 6 %
LYMPHOCYTES # BLD AUTO: 6 % (ref 12–49)
LYMPHOCYTES # BLD: 1 K/UL
LYMPHOCYTES # BLD: 1 K/UL (ref 0.8–3.5)
MCH RBC QN AUTO: 30.7 PG (ref 26–34)
MCH RBC QN AUTO: 31.9 PG (ref 26–34)
MCHC RBC AUTO-ENTMCNC: 33.6 G/DL (ref 30–36.5)
MCHC RBC AUTO-ENTMCNC: 35.3 G/DL (ref 30–36.5)
MCV RBC AUTO: 90.4 FL (ref 80–99)
MCV RBC AUTO: 91.3 FL (ref 80–99)
MONOCYTES # BLD: 1 K/UL
MONOCYTES # BLD: 1.3 K/UL (ref 0–1)
MONOCYTES NFR BLD AUTO: 6 %
MONOCYTES NFR BLD AUTO: 8 % (ref 5–13)
MYELOCYTES NFR BLD MANUAL: 1 %
NEUTS BAND NFR BLD MANUAL: 2 %
NEUTS SEG # BLD: 13.3 K/UL (ref 1.8–8)
NEUTS SEG # BLD: 13.8 K/UL
NEUTS SEG NFR BLD AUTO: 84 %
NEUTS SEG NFR BLD AUTO: 86 % (ref 32–75)
PLATELET # BLD AUTO: 251 K/UL (ref 150–400)
PLATELET # BLD AUTO: 267 K/UL (ref 150–400)
POTASSIUM SERPL-SCNC: 3.7 MMOL/L (ref 3.5–5.1)
POTASSIUM SERPL-SCNC: 3.9 MMOL/L (ref 3.5–5.1)
POTASSIUM SERPL-SCNC: 4.1 MMOL/L (ref 3.5–5.1)
RBC # BLD AUTO: 3 M/UL (ref 4.1–5.7)
RBC # BLD AUTO: 3.01 M/UL (ref 4.1–5.7)
RBC MORPH BLD: ABNORMAL
SODIUM SERPL-SCNC: 132 MMOL/L (ref 136–145)
SODIUM SERPL-SCNC: 132 MMOL/L (ref 136–145)
SODIUM SERPL-SCNC: 133 MMOL/L (ref 136–145)
WBC # BLD AUTO: 15.6 K/UL (ref 4.1–11.1)
WBC # BLD AUTO: 16 K/UL (ref 4.1–11.1)

## 2017-08-07 PROCEDURE — 36415 COLL VENOUS BLD VENIPUNCTURE: CPT | Performed by: PHYSICAL MEDICINE & REHABILITATION

## 2017-08-07 PROCEDURE — 74011250637 HC RX REV CODE- 250/637: Performed by: PHYSICAL MEDICINE & REHABILITATION

## 2017-08-07 PROCEDURE — 85652 RBC SED RATE AUTOMATED: CPT | Performed by: PHYSICAL MEDICINE & REHABILITATION

## 2017-08-07 PROCEDURE — 80048 BASIC METABOLIC PNL TOTAL CA: CPT | Performed by: PHYSICAL MEDICINE & REHABILITATION

## 2017-08-07 PROCEDURE — 74011636637 HC RX REV CODE- 636/637: Performed by: PHYSICAL MEDICINE & REHABILITATION

## 2017-08-07 PROCEDURE — 86140 C-REACTIVE PROTEIN: CPT | Performed by: PHYSICAL MEDICINE & REHABILITATION

## 2017-08-07 PROCEDURE — 74011250636 HC RX REV CODE- 250/636: Performed by: PHYSICAL MEDICINE & REHABILITATION

## 2017-08-07 PROCEDURE — 85025 COMPLETE CBC W/AUTO DIFF WBC: CPT | Performed by: PHYSICAL MEDICINE & REHABILITATION

## 2017-08-07 RX ORDER — LEVOFLOXACIN 5 MG/ML
500 INJECTION, SOLUTION INTRAVENOUS DAILY
Status: DISCONTINUED | OUTPATIENT
Start: 2017-08-07 | End: 2017-08-12

## 2017-08-07 RX ORDER — MIRTAZAPINE 15 MG/1
15 TABLET, FILM COATED ORAL
Status: DISCONTINUED | OUTPATIENT
Start: 2017-08-07 | End: 2017-08-08

## 2017-08-07 RX ORDER — SODIUM CHLORIDE 9 MG/ML
60 INJECTION, SOLUTION INTRAVENOUS CONTINUOUS
Status: DISCONTINUED | OUTPATIENT
Start: 2017-08-07 | End: 2017-08-11

## 2017-08-07 RX ORDER — LEVOFLOXACIN 500 MG/1
500 TABLET, FILM COATED ORAL
Status: DISCONTINUED | OUTPATIENT
Start: 2017-08-08 | End: 2017-08-07

## 2017-08-07 RX ADMIN — INSULIN LISPRO 2 UNITS: 100 INJECTION, SOLUTION INTRAVENOUS; SUBCUTANEOUS at 08:49

## 2017-08-07 RX ADMIN — LEVOFLOXACIN 250 MG: 250 TABLET, FILM COATED ORAL at 08:49

## 2017-08-07 RX ADMIN — SODIUM CHLORIDE 60 ML/HR: 900 INJECTION, SOLUTION INTRAVENOUS at 08:00

## 2017-08-07 RX ADMIN — Medication 400 MG: at 08:49

## 2017-08-07 RX ADMIN — MIRTAZAPINE 15 MG: 15 TABLET, FILM COATED ORAL at 21:30

## 2017-08-07 RX ADMIN — METOPROLOL TARTRATE 12.5 MG: 25 TABLET ORAL at 08:48

## 2017-08-07 RX ADMIN — Medication 200 MG: at 08:48

## 2017-08-07 RX ADMIN — METOPROLOL TARTRATE 12.5 MG: 25 TABLET ORAL at 21:30

## 2017-08-07 RX ADMIN — INSULIN LISPRO 2 UNITS: 100 INJECTION, SOLUTION INTRAVENOUS; SUBCUTANEOUS at 12:16

## 2017-08-07 RX ADMIN — THERA TABS 1 TABLET: TAB at 08:48

## 2017-08-07 RX ADMIN — LISINOPRIL 5 MG: 5 TABLET ORAL at 08:48

## 2017-08-07 RX ADMIN — DOCUSATE SODIUM AND SENNOSIDES 1 TABLET: 8.6; 5 TABLET, FILM COATED ORAL at 21:30

## 2017-08-07 RX ADMIN — INSULIN LISPRO 2 UNITS: 100 INJECTION, SOLUTION INTRAVENOUS; SUBCUTANEOUS at 21:28

## 2017-08-07 RX ADMIN — LEVOFLOXACIN 500 MG: 5 INJECTION, SOLUTION INTRAVENOUS at 11:29

## 2017-08-08 LAB
ANION GAP BLD CALC-SCNC: 9 MMOL/L (ref 5–15)
BASOPHILS # BLD AUTO: 0 K/UL (ref 0–0.1)
BASOPHILS # BLD: 0 % (ref 0–1)
BUN SERPL-MCNC: 53 MG/DL (ref 6–20)
BUN/CREAT SERPL: 44 (ref 12–20)
CALCIUM SERPL-MCNC: 8.3 MG/DL (ref 8.5–10.1)
CHLORIDE SERPL-SCNC: 102 MMOL/L (ref 97–108)
CO2 SERPL-SCNC: 23 MMOL/L (ref 21–32)
CREAT SERPL-MCNC: 1.2 MG/DL (ref 0.7–1.3)
DIFFERENTIAL METHOD BLD: ABNORMAL
EOSINOPHIL # BLD: 0.1 K/UL (ref 0–0.4)
EOSINOPHIL NFR BLD: 1 % (ref 0–7)
ERYTHROCYTE [DISTWIDTH] IN BLOOD BY AUTOMATED COUNT: 13.2 % (ref 11.5–14.5)
GLUCOSE SERPL-MCNC: 157 MG/DL (ref 65–100)
HCT VFR BLD AUTO: 27.3 % (ref 36.6–50.3)
HGB BLD-MCNC: 9.2 G/DL (ref 12.1–17)
LYMPHOCYTES # BLD AUTO: 16 % (ref 12–49)
LYMPHOCYTES # BLD: 2.2 K/UL (ref 0.8–3.5)
MAGNESIUM SERPL-MCNC: 2.1 MG/DL (ref 1.6–2.4)
MCH RBC QN AUTO: 31 PG (ref 26–34)
MCHC RBC AUTO-ENTMCNC: 33.7 G/DL (ref 30–36.5)
MCV RBC AUTO: 91.9 FL (ref 80–99)
MONOCYTES # BLD: 0.4 K/UL (ref 0–1)
MONOCYTES NFR BLD AUTO: 3 % (ref 5–13)
NEUTS SEG # BLD: 10.9 K/UL (ref 1.8–8)
NEUTS SEG NFR BLD AUTO: 80 % (ref 32–75)
PLATELET # BLD AUTO: 303 K/UL (ref 150–400)
POTASSIUM SERPL-SCNC: 3.8 MMOL/L (ref 3.5–5.1)
RBC # BLD AUTO: 2.97 M/UL (ref 4.1–5.7)
RBC MORPH BLD: ABNORMAL
SODIUM SERPL-SCNC: 134 MMOL/L (ref 136–145)
WBC # BLD AUTO: 13.6 K/UL (ref 4.1–11.1)

## 2017-08-08 PROCEDURE — 36415 COLL VENOUS BLD VENIPUNCTURE: CPT | Performed by: PHYSICAL MEDICINE & REHABILITATION

## 2017-08-08 PROCEDURE — 74011250636 HC RX REV CODE- 250/636: Performed by: PHYSICAL MEDICINE & REHABILITATION

## 2017-08-08 PROCEDURE — 85025 COMPLETE CBC W/AUTO DIFF WBC: CPT | Performed by: PHYSICAL MEDICINE & REHABILITATION

## 2017-08-08 PROCEDURE — 74011636637 HC RX REV CODE- 636/637: Performed by: PHYSICAL MEDICINE & REHABILITATION

## 2017-08-08 PROCEDURE — 80048 BASIC METABOLIC PNL TOTAL CA: CPT | Performed by: PHYSICAL MEDICINE & REHABILITATION

## 2017-08-08 PROCEDURE — 74011250637 HC RX REV CODE- 250/637: Performed by: PHYSICAL MEDICINE & REHABILITATION

## 2017-08-08 PROCEDURE — 83735 ASSAY OF MAGNESIUM: CPT | Performed by: PHYSICAL MEDICINE & REHABILITATION

## 2017-08-08 RX ORDER — MIRTAZAPINE 15 MG/1
30 TABLET, FILM COATED ORAL
Status: DISCONTINUED | OUTPATIENT
Start: 2017-08-08 | End: 2017-08-26 | Stop reason: HOSPADM

## 2017-08-08 RX ADMIN — Medication 400 MG: at 08:12

## 2017-08-08 RX ADMIN — THERA TABS 1 TABLET: TAB at 08:12

## 2017-08-08 RX ADMIN — INSULIN LISPRO 2 UNITS: 100 INJECTION, SOLUTION INTRAVENOUS; SUBCUTANEOUS at 08:12

## 2017-08-08 RX ADMIN — INSULIN LISPRO 2 UNITS: 100 INJECTION, SOLUTION INTRAVENOUS; SUBCUTANEOUS at 12:55

## 2017-08-08 RX ADMIN — DOCUSATE SODIUM AND SENNOSIDES 1 TABLET: 8.6; 5 TABLET, FILM COATED ORAL at 21:14

## 2017-08-08 RX ADMIN — METOPROLOL TARTRATE 12.5 MG: 25 TABLET ORAL at 21:15

## 2017-08-08 RX ADMIN — METOPROLOL TARTRATE 12.5 MG: 25 TABLET ORAL at 08:12

## 2017-08-08 RX ADMIN — LEVOFLOXACIN 500 MG: 5 INJECTION, SOLUTION INTRAVENOUS at 08:12

## 2017-08-08 RX ADMIN — SODIUM CHLORIDE 60 ML/HR: 900 INJECTION, SOLUTION INTRAVENOUS at 18:27

## 2017-08-08 RX ADMIN — Medication 200 MG: at 08:12

## 2017-08-08 RX ADMIN — MIRTAZAPINE 30 MG: 15 TABLET, FILM COATED ORAL at 21:14

## 2017-08-08 RX ADMIN — LISINOPRIL 5 MG: 5 TABLET ORAL at 08:12

## 2017-08-09 PROCEDURE — 74011250637 HC RX REV CODE- 250/637: Performed by: PHYSICAL MEDICINE & REHABILITATION

## 2017-08-09 PROCEDURE — 74011636637 HC RX REV CODE- 636/637: Performed by: PHYSICAL MEDICINE & REHABILITATION

## 2017-08-09 PROCEDURE — 74011250636 HC RX REV CODE- 250/636: Performed by: PHYSICAL MEDICINE & REHABILITATION

## 2017-08-09 RX ADMIN — Medication 400 MG: at 08:19

## 2017-08-09 RX ADMIN — THERA TABS 1 TABLET: TAB at 08:18

## 2017-08-09 RX ADMIN — ACETAMINOPHEN 650 MG: 325 TABLET, FILM COATED ORAL at 03:23

## 2017-08-09 RX ADMIN — ACETAMINOPHEN 650 MG: 325 TABLET, FILM COATED ORAL at 08:19

## 2017-08-09 RX ADMIN — MIRTAZAPINE 30 MG: 15 TABLET, FILM COATED ORAL at 21:42

## 2017-08-09 RX ADMIN — ACETAMINOPHEN 650 MG: 325 TABLET, FILM COATED ORAL at 11:41

## 2017-08-09 RX ADMIN — LISINOPRIL 5 MG: 5 TABLET ORAL at 08:19

## 2017-08-09 RX ADMIN — Medication 200 MG: at 08:18

## 2017-08-09 RX ADMIN — LEVOFLOXACIN 500 MG: 5 INJECTION, SOLUTION INTRAVENOUS at 09:00

## 2017-08-09 RX ADMIN — INSULIN LISPRO 2 UNITS: 100 INJECTION, SOLUTION INTRAVENOUS; SUBCUTANEOUS at 08:19

## 2017-08-09 RX ADMIN — DOCUSATE SODIUM AND SENNOSIDES 1 TABLET: 8.6; 5 TABLET, FILM COATED ORAL at 21:42

## 2017-08-09 RX ADMIN — METOPROLOL TARTRATE 12.5 MG: 25 TABLET ORAL at 21:40

## 2017-08-09 RX ADMIN — SODIUM CHLORIDE 60 ML/HR: 900 INJECTION, SOLUTION INTRAVENOUS at 03:26

## 2017-08-09 RX ADMIN — METOPROLOL TARTRATE 12.5 MG: 25 TABLET ORAL at 08:19

## 2017-08-09 RX ADMIN — ACETAMINOPHEN 650 MG: 325 TABLET, FILM COATED ORAL at 21:41

## 2017-08-09 RX ADMIN — SODIUM CHLORIDE 60 ML/HR: 900 INJECTION, SOLUTION INTRAVENOUS at 09:06

## 2017-08-10 LAB
ANION GAP BLD CALC-SCNC: 8 MMOL/L (ref 5–15)
BUN SERPL-MCNC: 32 MG/DL (ref 6–20)
BUN/CREAT SERPL: 33 (ref 12–20)
CALCIUM SERPL-MCNC: 7.9 MG/DL (ref 8.5–10.1)
CHLORIDE SERPL-SCNC: 110 MMOL/L (ref 97–108)
CO2 SERPL-SCNC: 22 MMOL/L (ref 21–32)
CREAT SERPL-MCNC: 0.96 MG/DL (ref 0.7–1.3)
CRP SERPL-MCNC: 9.07 MG/DL (ref 0–0.6)
ERYTHROCYTE [DISTWIDTH] IN BLOOD BY AUTOMATED COUNT: 13.4 % (ref 11.5–14.5)
GLUCOSE SERPL-MCNC: 125 MG/DL (ref 65–100)
HCT VFR BLD AUTO: 27.7 % (ref 36.6–50.3)
HGB BLD-MCNC: 9.2 G/DL (ref 12.1–17)
MCH RBC QN AUTO: 30.9 PG (ref 26–34)
MCHC RBC AUTO-ENTMCNC: 33.2 G/DL (ref 30–36.5)
MCV RBC AUTO: 93 FL (ref 80–99)
PLATELET # BLD AUTO: 297 K/UL (ref 150–400)
POTASSIUM SERPL-SCNC: 3.9 MMOL/L (ref 3.5–5.1)
RBC # BLD AUTO: 2.98 M/UL (ref 4.1–5.7)
SODIUM SERPL-SCNC: 140 MMOL/L (ref 136–145)
WBC # BLD AUTO: 11.2 K/UL (ref 4.1–11.1)

## 2017-08-10 PROCEDURE — 74011250637 HC RX REV CODE- 250/637: Performed by: PHYSICAL MEDICINE & REHABILITATION

## 2017-08-10 PROCEDURE — 36415 COLL VENOUS BLD VENIPUNCTURE: CPT | Performed by: PHYSICAL MEDICINE & REHABILITATION

## 2017-08-10 PROCEDURE — 80048 BASIC METABOLIC PNL TOTAL CA: CPT | Performed by: PHYSICAL MEDICINE & REHABILITATION

## 2017-08-10 PROCEDURE — 86140 C-REACTIVE PROTEIN: CPT | Performed by: PHYSICAL MEDICINE & REHABILITATION

## 2017-08-10 PROCEDURE — 85027 COMPLETE CBC AUTOMATED: CPT | Performed by: PHYSICAL MEDICINE & REHABILITATION

## 2017-08-10 PROCEDURE — 74011250636 HC RX REV CODE- 250/636: Performed by: PHYSICAL MEDICINE & REHABILITATION

## 2017-08-10 PROCEDURE — 74011636637 HC RX REV CODE- 636/637: Performed by: PHYSICAL MEDICINE & REHABILITATION

## 2017-08-10 RX ORDER — DIPHENOXYLATE HYDROCHLORIDE AND ATROPINE SULFATE 2.5; .025 MG/1; MG/1
1 TABLET ORAL
Status: COMPLETED | OUTPATIENT
Start: 2017-08-10 | End: 2017-08-10

## 2017-08-10 RX ORDER — DIPHENOXYLATE HYDROCHLORIDE AND ATROPINE SULFATE 2.5; .025 MG/1; MG/1
1 TABLET ORAL
Status: DISCONTINUED | OUTPATIENT
Start: 2017-08-10 | End: 2017-08-26 | Stop reason: HOSPADM

## 2017-08-10 RX ADMIN — METHOTREXATE 1 TABLET: 2.5 TABLET ORAL at 19:24

## 2017-08-10 RX ADMIN — Medication 200 MG: at 09:12

## 2017-08-10 RX ADMIN — DOCUSATE SODIUM AND SENNOSIDES 1 TABLET: 8.6; 5 TABLET, FILM COATED ORAL at 21:14

## 2017-08-10 RX ADMIN — INSULIN LISPRO 4 UNITS: 100 INJECTION, SOLUTION INTRAVENOUS; SUBCUTANEOUS at 22:02

## 2017-08-10 RX ADMIN — Medication 400 MG: at 09:13

## 2017-08-10 RX ADMIN — LISINOPRIL 5 MG: 5 TABLET ORAL at 09:12

## 2017-08-10 RX ADMIN — MIRTAZAPINE 30 MG: 15 TABLET, FILM COATED ORAL at 21:14

## 2017-08-10 RX ADMIN — ACETAMINOPHEN 650 MG: 325 TABLET, FILM COATED ORAL at 12:16

## 2017-08-10 RX ADMIN — DIPHENOXYLATE HYDROCHLORIDE AND ATROPINE SULFATE 1 TABLET: 2.5; .025 TABLET ORAL at 11:00

## 2017-08-10 RX ADMIN — ACETAMINOPHEN 650 MG: 325 TABLET, FILM COATED ORAL at 19:23

## 2017-08-10 RX ADMIN — THERA TABS 1 TABLET: TAB at 09:12

## 2017-08-10 RX ADMIN — METOPROLOL TARTRATE 12.5 MG: 25 TABLET ORAL at 09:13

## 2017-08-10 RX ADMIN — LEVOFLOXACIN 500 MG: 5 INJECTION, SOLUTION INTRAVENOUS at 09:13

## 2017-08-10 RX ADMIN — Medication 1 CAPSULE: at 11:02

## 2017-08-10 RX ADMIN — ACETAMINOPHEN 650 MG: 325 TABLET, FILM COATED ORAL at 09:12

## 2017-08-10 RX ADMIN — INSULIN LISPRO 2 UNITS: 100 INJECTION, SOLUTION INTRAVENOUS; SUBCUTANEOUS at 12:16

## 2017-08-11 LAB
ANION GAP BLD CALC-SCNC: 5 MMOL/L (ref 5–15)
BUN SERPL-MCNC: 25 MG/DL (ref 6–20)
BUN/CREAT SERPL: 30 (ref 12–20)
CALCIUM SERPL-MCNC: 7.6 MG/DL (ref 8.5–10.1)
CHLORIDE SERPL-SCNC: 112 MMOL/L (ref 97–108)
CO2 SERPL-SCNC: 25 MMOL/L (ref 21–32)
CREAT SERPL-MCNC: 0.82 MG/DL (ref 0.7–1.3)
CRP SERPL-MCNC: 7.16 MG/DL (ref 0–0.6)
ERYTHROCYTE [DISTWIDTH] IN BLOOD BY AUTOMATED COUNT: 13.4 % (ref 11.5–14.5)
GLUCOSE SERPL-MCNC: 120 MG/DL (ref 65–100)
HCT VFR BLD AUTO: 24.4 % (ref 36.6–50.3)
HGB BLD-MCNC: 8 G/DL (ref 12.1–17)
MCH RBC QN AUTO: 30.4 PG (ref 26–34)
MCHC RBC AUTO-ENTMCNC: 32.8 G/DL (ref 30–36.5)
MCV RBC AUTO: 92.8 FL (ref 80–99)
PLATELET # BLD AUTO: 253 K/UL (ref 150–400)
POTASSIUM SERPL-SCNC: 3.4 MMOL/L (ref 3.5–5.1)
RBC # BLD AUTO: 2.63 M/UL (ref 4.1–5.7)
SODIUM SERPL-SCNC: 142 MMOL/L (ref 136–145)
WBC # BLD AUTO: 9.1 K/UL (ref 4.1–11.1)

## 2017-08-11 PROCEDURE — 74011636637 HC RX REV CODE- 636/637: Performed by: PHYSICAL MEDICINE & REHABILITATION

## 2017-08-11 PROCEDURE — 85027 COMPLETE CBC AUTOMATED: CPT | Performed by: PHYSICAL MEDICINE & REHABILITATION

## 2017-08-11 PROCEDURE — 74011250636 HC RX REV CODE- 250/636: Performed by: PHYSICAL MEDICINE & REHABILITATION

## 2017-08-11 PROCEDURE — 36415 COLL VENOUS BLD VENIPUNCTURE: CPT | Performed by: PHYSICAL MEDICINE & REHABILITATION

## 2017-08-11 PROCEDURE — 80048 BASIC METABOLIC PNL TOTAL CA: CPT | Performed by: PHYSICAL MEDICINE & REHABILITATION

## 2017-08-11 PROCEDURE — 86140 C-REACTIVE PROTEIN: CPT | Performed by: PHYSICAL MEDICINE & REHABILITATION

## 2017-08-11 PROCEDURE — 74011250637 HC RX REV CODE- 250/637: Performed by: PHYSICAL MEDICINE & REHABILITATION

## 2017-08-11 RX ORDER — SODIUM CHLORIDE 0.9 % (FLUSH) 0.9 %
5 SYRINGE (ML) INJECTION EVERY 8 HOURS
Status: DISCONTINUED | OUTPATIENT
Start: 2017-08-11 | End: 2017-08-13

## 2017-08-11 RX ORDER — SODIUM CHLORIDE 0.9 % (FLUSH) 0.9 %
5 SYRINGE (ML) INJECTION AS NEEDED
Status: DISCONTINUED | OUTPATIENT
Start: 2017-08-11 | End: 2017-08-13

## 2017-08-11 RX ADMIN — Medication 1 CAPSULE: at 09:26

## 2017-08-11 RX ADMIN — ACETAMINOPHEN 650 MG: 325 TABLET, FILM COATED ORAL at 04:01

## 2017-08-11 RX ADMIN — THERA TABS 1 TABLET: TAB at 09:26

## 2017-08-11 RX ADMIN — METOPROLOL TARTRATE 12.5 MG: 25 TABLET ORAL at 09:31

## 2017-08-11 RX ADMIN — Medication 400 MG: at 09:26

## 2017-08-11 RX ADMIN — LEVOFLOXACIN 500 MG: 5 INJECTION, SOLUTION INTRAVENOUS at 09:27

## 2017-08-11 RX ADMIN — ACETAMINOPHEN 650 MG: 325 TABLET, FILM COATED ORAL at 17:15

## 2017-08-11 RX ADMIN — LISINOPRIL 5 MG: 5 TABLET ORAL at 09:31

## 2017-08-11 RX ADMIN — Medication 5 ML: at 21:24

## 2017-08-11 RX ADMIN — METOPROLOL TARTRATE 12.5 MG: 25 TABLET ORAL at 21:20

## 2017-08-11 RX ADMIN — Medication 200 MG: at 09:26

## 2017-08-11 RX ADMIN — Medication 5 ML: at 19:03

## 2017-08-11 RX ADMIN — INSULIN LISPRO 4 UNITS: 100 INJECTION, SOLUTION INTRAVENOUS; SUBCUTANEOUS at 21:19

## 2017-08-11 RX ADMIN — MIRTAZAPINE 30 MG: 15 TABLET, FILM COATED ORAL at 21:23

## 2017-08-11 RX ADMIN — ACETAMINOPHEN 650 MG: 325 TABLET, FILM COATED ORAL at 10:20

## 2017-08-11 RX ADMIN — DOCUSATE SODIUM AND SENNOSIDES 1 TABLET: 8.6; 5 TABLET, FILM COATED ORAL at 21:22

## 2017-08-11 RX ADMIN — INSULIN LISPRO 2 UNITS: 100 INJECTION, SOLUTION INTRAVENOUS; SUBCUTANEOUS at 12:40

## 2017-08-12 PROCEDURE — 74011250636 HC RX REV CODE- 250/636: Performed by: PHYSICAL MEDICINE & REHABILITATION

## 2017-08-12 PROCEDURE — 74011250637 HC RX REV CODE- 250/637: Performed by: PHYSICAL MEDICINE & REHABILITATION

## 2017-08-12 PROCEDURE — 74011636637 HC RX REV CODE- 636/637: Performed by: PHYSICAL MEDICINE & REHABILITATION

## 2017-08-12 RX ORDER — GLIPIZIDE 5 MG/1
2.5 TABLET ORAL
Status: DISCONTINUED | OUTPATIENT
Start: 2017-08-13 | End: 2017-08-26 | Stop reason: HOSPADM

## 2017-08-12 RX ADMIN — METOPROLOL TARTRATE 12.5 MG: 25 TABLET ORAL at 21:33

## 2017-08-12 RX ADMIN — Medication 1 CAPSULE: at 08:55

## 2017-08-12 RX ADMIN — ACETAMINOPHEN 650 MG: 325 TABLET, FILM COATED ORAL at 23:29

## 2017-08-12 RX ADMIN — Medication 400 MG: at 08:56

## 2017-08-12 RX ADMIN — Medication 5 ML: at 05:45

## 2017-08-12 RX ADMIN — Medication 5 ML: at 21:39

## 2017-08-12 RX ADMIN — METOPROLOL TARTRATE 12.5 MG: 25 TABLET ORAL at 08:56

## 2017-08-12 RX ADMIN — Medication 5 ML: at 12:15

## 2017-08-12 RX ADMIN — Medication 200 MG: at 08:55

## 2017-08-12 RX ADMIN — LISINOPRIL 5 MG: 5 TABLET ORAL at 08:56

## 2017-08-12 RX ADMIN — THERA TABS 1 TABLET: TAB at 08:56

## 2017-08-12 RX ADMIN — Medication 5 ML: at 11:02

## 2017-08-12 RX ADMIN — MIRTAZAPINE 30 MG: 15 TABLET, FILM COATED ORAL at 21:34

## 2017-08-12 RX ADMIN — INSULIN LISPRO 2 UNITS: 100 INJECTION, SOLUTION INTRAVENOUS; SUBCUTANEOUS at 13:23

## 2017-08-12 RX ADMIN — LEVOFLOXACIN 500 MG: 5 INJECTION, SOLUTION INTRAVENOUS at 11:04

## 2017-08-12 RX ADMIN — Medication 5 ML: at 13:23

## 2017-08-13 LAB
ANION GAP BLD CALC-SCNC: 4 MMOL/L (ref 5–15)
BUN SERPL-MCNC: 16 MG/DL (ref 6–20)
BUN/CREAT SERPL: 20 (ref 12–20)
CALCIUM SERPL-MCNC: 8 MG/DL (ref 8.5–10.1)
CHLORIDE SERPL-SCNC: 111 MMOL/L (ref 97–108)
CO2 SERPL-SCNC: 27 MMOL/L (ref 21–32)
CREAT SERPL-MCNC: 0.8 MG/DL (ref 0.7–1.3)
ERYTHROCYTE [DISTWIDTH] IN BLOOD BY AUTOMATED COUNT: 13.4 % (ref 11.5–14.5)
GLUCOSE SERPL-MCNC: 121 MG/DL (ref 65–100)
HCT VFR BLD AUTO: 26.4 % (ref 36.6–50.3)
HGB BLD-MCNC: 8.7 G/DL (ref 12.1–17)
MCH RBC QN AUTO: 30.9 PG (ref 26–34)
MCHC RBC AUTO-ENTMCNC: 33 G/DL (ref 30–36.5)
MCV RBC AUTO: 93.6 FL (ref 80–99)
PLATELET # BLD AUTO: 278 K/UL (ref 150–400)
POTASSIUM SERPL-SCNC: 3.4 MMOL/L (ref 3.5–5.1)
RBC # BLD AUTO: 2.82 M/UL (ref 4.1–5.7)
SODIUM SERPL-SCNC: 142 MMOL/L (ref 136–145)
WBC # BLD AUTO: 8.8 K/UL (ref 4.1–11.1)

## 2017-08-13 PROCEDURE — 74011250637 HC RX REV CODE- 250/637: Performed by: PHYSICAL MEDICINE & REHABILITATION

## 2017-08-13 PROCEDURE — 36415 COLL VENOUS BLD VENIPUNCTURE: CPT | Performed by: PHYSICAL MEDICINE & REHABILITATION

## 2017-08-13 PROCEDURE — 85027 COMPLETE CBC AUTOMATED: CPT | Performed by: PHYSICAL MEDICINE & REHABILITATION

## 2017-08-13 PROCEDURE — 80048 BASIC METABOLIC PNL TOTAL CA: CPT | Performed by: PHYSICAL MEDICINE & REHABILITATION

## 2017-08-13 PROCEDURE — 74011636637 HC RX REV CODE- 636/637: Performed by: PHYSICAL MEDICINE & REHABILITATION

## 2017-08-13 RX ORDER — POTASSIUM CHLORIDE 750 MG/1
10 TABLET, FILM COATED, EXTENDED RELEASE ORAL DAILY
Status: DISCONTINUED | OUTPATIENT
Start: 2017-08-14 | End: 2017-08-22

## 2017-08-13 RX ORDER — FUROSEMIDE 20 MG/1
20 TABLET ORAL DAILY
Status: DISCONTINUED | OUTPATIENT
Start: 2017-08-14 | End: 2017-08-22

## 2017-08-13 RX ADMIN — METOPROLOL TARTRATE 12.5 MG: 25 TABLET ORAL at 21:00

## 2017-08-13 RX ADMIN — MIRTAZAPINE 30 MG: 15 TABLET, FILM COATED ORAL at 21:00

## 2017-08-13 RX ADMIN — LISINOPRIL 5 MG: 5 TABLET ORAL at 08:12

## 2017-08-13 RX ADMIN — Medication 1 CAPSULE: at 08:11

## 2017-08-13 RX ADMIN — GLIPIZIDE 2.5 MG: 5 TABLET ORAL at 08:11

## 2017-08-13 RX ADMIN — Medication 200 MG: at 08:11

## 2017-08-13 RX ADMIN — Medication 5 ML: at 06:52

## 2017-08-13 RX ADMIN — ACETAMINOPHEN 650 MG: 325 TABLET, FILM COATED ORAL at 22:14

## 2017-08-13 RX ADMIN — METOPROLOL TARTRATE 12.5 MG: 25 TABLET ORAL at 08:13

## 2017-08-13 RX ADMIN — INSULIN LISPRO 4 UNITS: 100 INJECTION, SOLUTION INTRAVENOUS; SUBCUTANEOUS at 21:31

## 2017-08-13 RX ADMIN — THERA TABS 1 TABLET: TAB at 08:11

## 2017-08-14 ENCOUNTER — APPOINTMENT (OUTPATIENT)
Dept: ULTRASOUND IMAGING | Age: 82
End: 2017-08-14
Attending: PHYSICAL MEDICINE & REHABILITATION

## 2017-08-14 LAB — C DIFF TOX GENS STL QL NAA+PROBE: NEGATIVE

## 2017-08-14 PROCEDURE — 74011250637 HC RX REV CODE- 250/637: Performed by: PHYSICAL MEDICINE & REHABILITATION

## 2017-08-14 PROCEDURE — 74011250636 HC RX REV CODE- 250/636: Performed by: PHYSICAL MEDICINE & REHABILITATION

## 2017-08-14 PROCEDURE — 93970 EXTREMITY STUDY: CPT

## 2017-08-14 PROCEDURE — 87493 C DIFF AMPLIFIED PROBE: CPT | Performed by: PHYSICAL MEDICINE & REHABILITATION

## 2017-08-14 RX ORDER — ENOXAPARIN SODIUM 100 MG/ML
30 INJECTION SUBCUTANEOUS EVERY 12 HOURS
Status: DISCONTINUED | OUTPATIENT
Start: 2017-08-14 | End: 2017-08-14 | Stop reason: CLARIF

## 2017-08-14 RX ADMIN — FUROSEMIDE 20 MG: 20 TABLET ORAL at 11:23

## 2017-08-14 RX ADMIN — Medication 200 MG: at 11:23

## 2017-08-14 RX ADMIN — THERA TABS 1 TABLET: TAB at 11:23

## 2017-08-14 RX ADMIN — METOPROLOL TARTRATE 12.5 MG: 25 TABLET ORAL at 20:33

## 2017-08-14 RX ADMIN — ENOXAPARIN SODIUM 140 MG: 100 INJECTION, SOLUTION INTRAVENOUS; SUBCUTANEOUS at 15:32

## 2017-08-14 RX ADMIN — MIRTAZAPINE 30 MG: 15 TABLET, FILM COATED ORAL at 20:33

## 2017-08-14 RX ADMIN — ACETAMINOPHEN 650 MG: 325 TABLET, FILM COATED ORAL at 11:23

## 2017-08-14 RX ADMIN — METOPROLOL TARTRATE 12.5 MG: 25 TABLET ORAL at 11:23

## 2017-08-14 RX ADMIN — LISINOPRIL 5 MG: 5 TABLET ORAL at 11:23

## 2017-08-14 RX ADMIN — Medication 1 CAPSULE: at 11:23

## 2017-08-14 RX ADMIN — POTASSIUM CHLORIDE 10 MEQ: 750 TABLET, FILM COATED, EXTENDED RELEASE ORAL at 11:23

## 2017-08-14 RX ADMIN — ACETAMINOPHEN 650 MG: 325 TABLET, FILM COATED ORAL at 23:42

## 2017-08-14 RX ADMIN — GLIPIZIDE 2.5 MG: 5 TABLET ORAL at 11:24

## 2017-08-15 PROCEDURE — 74011250636 HC RX REV CODE- 250/636: Performed by: PHYSICAL MEDICINE & REHABILITATION

## 2017-08-15 PROCEDURE — 74011250637 HC RX REV CODE- 250/637: Performed by: PHYSICAL MEDICINE & REHABILITATION

## 2017-08-15 RX ORDER — WARFARIN SODIUM 5 MG/1
5 TABLET ORAL EVERY EVENING
Status: DISCONTINUED | OUTPATIENT
Start: 2017-08-15 | End: 2017-08-18

## 2017-08-15 RX ADMIN — Medication 200 MG: at 09:35

## 2017-08-15 RX ADMIN — METOPROLOL TARTRATE 12.5 MG: 25 TABLET ORAL at 21:57

## 2017-08-15 RX ADMIN — WARFARIN SODIUM 5 MG: 5 TABLET ORAL at 17:24

## 2017-08-15 RX ADMIN — GLIPIZIDE 2.5 MG: 5 TABLET ORAL at 09:35

## 2017-08-15 RX ADMIN — Medication 1 CAPSULE: at 09:35

## 2017-08-15 RX ADMIN — ENOXAPARIN SODIUM 140 MG: 100 INJECTION, SOLUTION INTRAVENOUS; SUBCUTANEOUS at 16:09

## 2017-08-15 RX ADMIN — ACETAMINOPHEN 650 MG: 325 TABLET, FILM COATED ORAL at 09:35

## 2017-08-15 RX ADMIN — ACETAMINOPHEN 650 MG: 325 TABLET, FILM COATED ORAL at 16:08

## 2017-08-15 RX ADMIN — LISINOPRIL 5 MG: 5 TABLET ORAL at 09:35

## 2017-08-15 RX ADMIN — ACETAMINOPHEN 650 MG: 325 TABLET, FILM COATED ORAL at 21:58

## 2017-08-15 RX ADMIN — THERA TABS 1 TABLET: TAB at 09:35

## 2017-08-15 RX ADMIN — FUROSEMIDE 20 MG: 20 TABLET ORAL at 09:35

## 2017-08-15 RX ADMIN — MIRTAZAPINE 30 MG: 15 TABLET, FILM COATED ORAL at 21:57

## 2017-08-15 RX ADMIN — METOPROLOL TARTRATE 12.5 MG: 25 TABLET ORAL at 09:35

## 2017-08-15 RX ADMIN — POTASSIUM CHLORIDE 10 MEQ: 750 TABLET, FILM COATED, EXTENDED RELEASE ORAL at 09:36

## 2017-08-16 PROCEDURE — 74011250637 HC RX REV CODE- 250/637: Performed by: PHYSICAL MEDICINE & REHABILITATION

## 2017-08-16 PROCEDURE — 74011250636 HC RX REV CODE- 250/636: Performed by: PHYSICAL MEDICINE & REHABILITATION

## 2017-08-16 RX ADMIN — ENOXAPARIN SODIUM 140 MG: 100 INJECTION, SOLUTION INTRAVENOUS; SUBCUTANEOUS at 15:00

## 2017-08-16 RX ADMIN — METOPROLOL TARTRATE 12.5 MG: 25 TABLET ORAL at 09:52

## 2017-08-16 RX ADMIN — GLIPIZIDE 2.5 MG: 5 TABLET ORAL at 09:52

## 2017-08-16 RX ADMIN — THERA TABS 1 TABLET: TAB at 09:51

## 2017-08-16 RX ADMIN — POTASSIUM CHLORIDE 10 MEQ: 750 TABLET, FILM COATED, EXTENDED RELEASE ORAL at 09:51

## 2017-08-16 RX ADMIN — LISINOPRIL 5 MG: 5 TABLET ORAL at 09:53

## 2017-08-16 RX ADMIN — Medication 200 MG: at 09:00

## 2017-08-16 RX ADMIN — Medication 1 CAPSULE: at 09:52

## 2017-08-16 RX ADMIN — ACETAMINOPHEN 650 MG: 325 TABLET, FILM COATED ORAL at 20:51

## 2017-08-16 RX ADMIN — FUROSEMIDE 20 MG: 20 TABLET ORAL at 09:53

## 2017-08-16 RX ADMIN — MIRTAZAPINE 30 MG: 15 TABLET, FILM COATED ORAL at 20:53

## 2017-08-16 RX ADMIN — WARFARIN SODIUM 5 MG: 5 TABLET ORAL at 18:56

## 2017-08-17 LAB
ANION GAP SERPL CALC-SCNC: 5 MMOL/L (ref 5–15)
BUN SERPL-MCNC: 15 MG/DL (ref 6–20)
BUN/CREAT SERPL: 18 (ref 12–20)
CALCIUM SERPL-MCNC: 7.6 MG/DL (ref 8.5–10.1)
CHLORIDE SERPL-SCNC: 110 MMOL/L (ref 97–108)
CO2 SERPL-SCNC: 28 MMOL/L (ref 21–32)
CREAT SERPL-MCNC: 0.83 MG/DL (ref 0.7–1.3)
GLUCOSE SERPL-MCNC: 106 MG/DL (ref 65–100)
POTASSIUM SERPL-SCNC: 3.4 MMOL/L (ref 3.5–5.1)
SODIUM SERPL-SCNC: 143 MMOL/L (ref 136–145)

## 2017-08-17 PROCEDURE — 36415 COLL VENOUS BLD VENIPUNCTURE: CPT | Performed by: PHYSICAL MEDICINE & REHABILITATION

## 2017-08-17 PROCEDURE — 74011250637 HC RX REV CODE- 250/637: Performed by: PHYSICAL MEDICINE & REHABILITATION

## 2017-08-17 PROCEDURE — 74011250636 HC RX REV CODE- 250/636: Performed by: PHYSICAL MEDICINE & REHABILITATION

## 2017-08-17 PROCEDURE — 80048 BASIC METABOLIC PNL TOTAL CA: CPT | Performed by: PHYSICAL MEDICINE & REHABILITATION

## 2017-08-17 RX ORDER — METOPROLOL TARTRATE 25 MG/1
12.5 TABLET, FILM COATED ORAL EVERY 12 HOURS
Status: DISCONTINUED | OUTPATIENT
Start: 2017-08-18 | End: 2017-08-26 | Stop reason: HOSPADM

## 2017-08-17 RX ORDER — POTASSIUM CHLORIDE 750 MG/1
40 TABLET, FILM COATED, EXTENDED RELEASE ORAL
Status: COMPLETED | OUTPATIENT
Start: 2017-08-17 | End: 2017-08-17

## 2017-08-17 RX ADMIN — ACETAMINOPHEN 650 MG: 325 TABLET, FILM COATED ORAL at 15:39

## 2017-08-17 RX ADMIN — ACETAMINOPHEN 650 MG: 325 TABLET, FILM COATED ORAL at 21:56

## 2017-08-17 RX ADMIN — Medication 200 MG: at 08:24

## 2017-08-17 RX ADMIN — POTASSIUM CHLORIDE 10 MEQ: 750 TABLET, FILM COATED, EXTENDED RELEASE ORAL at 08:26

## 2017-08-17 RX ADMIN — LISINOPRIL 5 MG: 5 TABLET ORAL at 08:26

## 2017-08-17 RX ADMIN — ENOXAPARIN SODIUM 140 MG: 100 INJECTION, SOLUTION INTRAVENOUS; SUBCUTANEOUS at 15:39

## 2017-08-17 RX ADMIN — FUROSEMIDE 20 MG: 20 TABLET ORAL at 08:26

## 2017-08-17 RX ADMIN — Medication 1 CAPSULE: at 08:24

## 2017-08-17 RX ADMIN — POTASSIUM CHLORIDE 40 MEQ: 750 TABLET, FILM COATED, EXTENDED RELEASE ORAL at 12:21

## 2017-08-17 RX ADMIN — METOPROLOL TARTRATE 12.5 MG: 25 TABLET ORAL at 08:24

## 2017-08-17 RX ADMIN — ONDANSETRON 4 MG: 4 TABLET, ORALLY DISINTEGRATING ORAL at 23:06

## 2017-08-17 RX ADMIN — MIRTAZAPINE 30 MG: 15 TABLET, FILM COATED ORAL at 21:56

## 2017-08-17 RX ADMIN — GLIPIZIDE 2.5 MG: 5 TABLET ORAL at 08:26

## 2017-08-17 RX ADMIN — THERA TABS 1 TABLET: TAB at 08:26

## 2017-08-17 RX ADMIN — WARFARIN SODIUM 5 MG: 5 TABLET ORAL at 18:30

## 2017-08-18 PROCEDURE — 74011250637 HC RX REV CODE- 250/637: Performed by: PHYSICAL MEDICINE & REHABILITATION

## 2017-08-18 RX ORDER — INSULIN LISPRO 100 [IU]/ML
INJECTION, SOLUTION INTRAVENOUS; SUBCUTANEOUS
Status: DISCONTINUED | OUTPATIENT
Start: 2017-08-18 | End: 2017-08-26 | Stop reason: HOSPADM

## 2017-08-18 RX ADMIN — THERA TABS 1 TABLET: TAB at 09:09

## 2017-08-18 RX ADMIN — WARFARIN SODIUM 3 MG: 2 TABLET ORAL at 17:46

## 2017-08-18 RX ADMIN — METOPROLOL TARTRATE 12.5 MG: 25 TABLET ORAL at 21:41

## 2017-08-18 RX ADMIN — GLIPIZIDE 2.5 MG: 5 TABLET ORAL at 09:07

## 2017-08-18 RX ADMIN — POTASSIUM CHLORIDE 10 MEQ: 750 TABLET, FILM COATED, EXTENDED RELEASE ORAL at 09:07

## 2017-08-18 RX ADMIN — Medication 200 MG: at 09:00

## 2017-08-18 RX ADMIN — LISINOPRIL 5 MG: 5 TABLET ORAL at 09:07

## 2017-08-18 RX ADMIN — ONDANSETRON 4 MG: 4 TABLET, ORALLY DISINTEGRATING ORAL at 22:27

## 2017-08-18 RX ADMIN — FUROSEMIDE 20 MG: 20 TABLET ORAL at 09:07

## 2017-08-18 RX ADMIN — METOPROLOL TARTRATE 12.5 MG: 25 TABLET ORAL at 09:08

## 2017-08-18 RX ADMIN — ACETAMINOPHEN 650 MG: 325 TABLET, FILM COATED ORAL at 21:42

## 2017-08-18 RX ADMIN — ACETAMINOPHEN 650 MG: 325 TABLET, FILM COATED ORAL at 17:46

## 2017-08-18 RX ADMIN — Medication 1 CAPSULE: at 09:07

## 2017-08-18 RX ADMIN — MIRTAZAPINE 30 MG: 15 TABLET, FILM COATED ORAL at 21:42

## 2017-08-19 LAB
ANION GAP SERPL CALC-SCNC: 6 MMOL/L (ref 5–15)
BASOPHILS # BLD: 0 K/UL (ref 0–0.1)
BASOPHILS NFR BLD: 0 % (ref 0–1)
BUN SERPL-MCNC: 21 MG/DL (ref 6–20)
BUN/CREAT SERPL: 20 (ref 12–20)
CALCIUM SERPL-MCNC: 8 MG/DL (ref 8.5–10.1)
CHLORIDE SERPL-SCNC: 102 MMOL/L (ref 97–108)
CO2 SERPL-SCNC: 29 MMOL/L (ref 21–32)
CREAT SERPL-MCNC: 1.04 MG/DL (ref 0.7–1.3)
EOSINOPHIL # BLD: 0.1 K/UL (ref 0–0.4)
EOSINOPHIL NFR BLD: 1 % (ref 0–7)
ERYTHROCYTE [DISTWIDTH] IN BLOOD BY AUTOMATED COUNT: 13.3 % (ref 11.5–14.5)
GLUCOSE SERPL-MCNC: 94 MG/DL (ref 65–100)
HCT VFR BLD AUTO: 28.5 % (ref 36.6–50.3)
HGB BLD-MCNC: 9.2 G/DL (ref 12.1–17)
LYMPHOCYTES # BLD: 1 K/UL (ref 0.8–3.5)
LYMPHOCYTES NFR BLD: 9 % (ref 12–49)
MCH RBC QN AUTO: 30.7 PG (ref 26–34)
MCHC RBC AUTO-ENTMCNC: 32.3 G/DL (ref 30–36.5)
MCV RBC AUTO: 95 FL (ref 80–99)
MONOCYTES # BLD: 0.8 K/UL (ref 0–1)
MONOCYTES NFR BLD: 8 % (ref 5–13)
NEUTS SEG # BLD: 8.7 K/UL (ref 1.8–8)
NEUTS SEG NFR BLD: 82 % (ref 32–75)
PLATELET # BLD AUTO: 301 K/UL (ref 150–400)
POTASSIUM SERPL-SCNC: 4.3 MMOL/L (ref 3.5–5.1)
RBC # BLD AUTO: 3 M/UL (ref 4.1–5.7)
SODIUM SERPL-SCNC: 137 MMOL/L (ref 136–145)
WBC # BLD AUTO: 10.6 K/UL (ref 4.1–11.1)

## 2017-08-19 PROCEDURE — 36415 COLL VENOUS BLD VENIPUNCTURE: CPT | Performed by: PHYSICAL MEDICINE & REHABILITATION

## 2017-08-19 PROCEDURE — 85025 COMPLETE CBC W/AUTO DIFF WBC: CPT | Performed by: PHYSICAL MEDICINE & REHABILITATION

## 2017-08-19 PROCEDURE — 74011250637 HC RX REV CODE- 250/637: Performed by: PHYSICAL MEDICINE & REHABILITATION

## 2017-08-19 PROCEDURE — 80048 BASIC METABOLIC PNL TOTAL CA: CPT | Performed by: PHYSICAL MEDICINE & REHABILITATION

## 2017-08-19 RX ADMIN — ACETAMINOPHEN 650 MG: 325 TABLET, FILM COATED ORAL at 15:57

## 2017-08-19 RX ADMIN — ACETAMINOPHEN 650 MG: 325 TABLET, FILM COATED ORAL at 18:47

## 2017-08-19 RX ADMIN — POTASSIUM CHLORIDE 10 MEQ: 750 TABLET, FILM COATED, EXTENDED RELEASE ORAL at 08:58

## 2017-08-19 RX ADMIN — LISINOPRIL 5 MG: 5 TABLET ORAL at 08:58

## 2017-08-19 RX ADMIN — WARFARIN SODIUM 3 MG: 2 TABLET ORAL at 17:33

## 2017-08-19 RX ADMIN — ACETAMINOPHEN 650 MG: 325 TABLET, FILM COATED ORAL at 03:23

## 2017-08-19 RX ADMIN — METOPROLOL TARTRATE 12.5 MG: 25 TABLET ORAL at 08:58

## 2017-08-19 RX ADMIN — MIRTAZAPINE 30 MG: 15 TABLET, FILM COATED ORAL at 21:29

## 2017-08-19 RX ADMIN — ACETAMINOPHEN 650 MG: 325 TABLET, FILM COATED ORAL at 21:29

## 2017-08-19 RX ADMIN — METHOTREXATE 1 TABLET: 2.5 TABLET ORAL at 15:12

## 2017-08-19 RX ADMIN — Medication 200 MG: at 08:58

## 2017-08-19 RX ADMIN — THERA TABS 1 TABLET: TAB at 08:58

## 2017-08-19 RX ADMIN — GLIPIZIDE 2.5 MG: 5 TABLET ORAL at 08:58

## 2017-08-19 RX ADMIN — FUROSEMIDE 20 MG: 20 TABLET ORAL at 08:58

## 2017-08-19 RX ADMIN — Medication 1 CAPSULE: at 08:58

## 2017-08-20 PROCEDURE — 74011250637 HC RX REV CODE- 250/637: Performed by: PHYSICAL MEDICINE & REHABILITATION

## 2017-08-20 RX ADMIN — ACETAMINOPHEN 650 MG: 325 TABLET, FILM COATED ORAL at 21:13

## 2017-08-20 RX ADMIN — FUROSEMIDE 20 MG: 20 TABLET ORAL at 08:51

## 2017-08-20 RX ADMIN — Medication 200 MG: at 08:51

## 2017-08-20 RX ADMIN — METOPROLOL TARTRATE 12.5 MG: 25 TABLET ORAL at 08:51

## 2017-08-20 RX ADMIN — ACETAMINOPHEN 650 MG: 325 TABLET, FILM COATED ORAL at 15:23

## 2017-08-20 RX ADMIN — Medication 1 CAPSULE: at 08:51

## 2017-08-20 RX ADMIN — MIRTAZAPINE 30 MG: 15 TABLET, FILM COATED ORAL at 21:13

## 2017-08-20 RX ADMIN — THERA TABS 1 TABLET: TAB at 08:52

## 2017-08-20 RX ADMIN — GLIPIZIDE 2.5 MG: 5 TABLET ORAL at 08:52

## 2017-08-20 RX ADMIN — LISINOPRIL 5 MG: 5 TABLET ORAL at 08:52

## 2017-08-20 RX ADMIN — METOPROLOL TARTRATE 12.5 MG: 25 TABLET ORAL at 21:13

## 2017-08-20 RX ADMIN — POTASSIUM CHLORIDE 10 MEQ: 750 TABLET, FILM COATED, EXTENDED RELEASE ORAL at 08:51

## 2017-08-20 RX ADMIN — WARFARIN SODIUM 3 MG: 2 TABLET ORAL at 17:54

## 2017-08-21 LAB
ANION GAP SERPL CALC-SCNC: 5 MMOL/L (ref 5–15)
BUN SERPL-MCNC: 23 MG/DL (ref 6–20)
BUN/CREAT SERPL: 26 (ref 12–20)
CALCIUM SERPL-MCNC: 8.1 MG/DL (ref 8.5–10.1)
CHLORIDE SERPL-SCNC: 103 MMOL/L (ref 97–108)
CO2 SERPL-SCNC: 31 MMOL/L (ref 21–32)
CREAT SERPL-MCNC: 0.88 MG/DL (ref 0.7–1.3)
ERYTHROCYTE [DISTWIDTH] IN BLOOD BY AUTOMATED COUNT: 13.4 % (ref 11.5–14.5)
GLUCOSE SERPL-MCNC: 121 MG/DL (ref 65–100)
HCT VFR BLD AUTO: 25.1 % (ref 36.6–50.3)
HGB BLD-MCNC: 8.2 G/DL (ref 12.1–17)
MCH RBC QN AUTO: 30.6 PG (ref 26–34)
MCHC RBC AUTO-ENTMCNC: 32.7 G/DL (ref 30–36.5)
MCV RBC AUTO: 93.7 FL (ref 80–99)
PLATELET # BLD AUTO: 248 K/UL (ref 150–400)
POTASSIUM SERPL-SCNC: 4 MMOL/L (ref 3.5–5.1)
RBC # BLD AUTO: 2.68 M/UL (ref 4.1–5.7)
SODIUM SERPL-SCNC: 139 MMOL/L (ref 136–145)
WBC # BLD AUTO: 7.8 K/UL (ref 4.1–11.1)

## 2017-08-21 PROCEDURE — 74011250637 HC RX REV CODE- 250/637: Performed by: PHYSICAL MEDICINE & REHABILITATION

## 2017-08-21 PROCEDURE — 85027 COMPLETE CBC AUTOMATED: CPT | Performed by: PHYSICAL MEDICINE & REHABILITATION

## 2017-08-21 PROCEDURE — 80048 BASIC METABOLIC PNL TOTAL CA: CPT | Performed by: PHYSICAL MEDICINE & REHABILITATION

## 2017-08-21 PROCEDURE — 36415 COLL VENOUS BLD VENIPUNCTURE: CPT | Performed by: PHYSICAL MEDICINE & REHABILITATION

## 2017-08-21 RX ADMIN — ACETAMINOPHEN 650 MG: 325 TABLET, FILM COATED ORAL at 20:55

## 2017-08-21 RX ADMIN — WARFARIN SODIUM 3 MG: 2 TABLET ORAL at 17:49

## 2017-08-21 RX ADMIN — GLIPIZIDE 2.5 MG: 5 TABLET ORAL at 08:37

## 2017-08-21 RX ADMIN — METOPROLOL TARTRATE 12.5 MG: 25 TABLET ORAL at 08:36

## 2017-08-21 RX ADMIN — Medication 1 CAPSULE: at 08:36

## 2017-08-21 RX ADMIN — ACETAMINOPHEN 650 MG: 325 TABLET, FILM COATED ORAL at 17:50

## 2017-08-21 RX ADMIN — FUROSEMIDE 20 MG: 20 TABLET ORAL at 08:37

## 2017-08-21 RX ADMIN — THERA TABS 1 TABLET: TAB at 08:37

## 2017-08-21 RX ADMIN — LISINOPRIL 5 MG: 5 TABLET ORAL at 08:36

## 2017-08-21 RX ADMIN — MIRTAZAPINE 30 MG: 15 TABLET, FILM COATED ORAL at 20:53

## 2017-08-21 RX ADMIN — POTASSIUM CHLORIDE 10 MEQ: 750 TABLET, FILM COATED, EXTENDED RELEASE ORAL at 08:37

## 2017-08-21 RX ADMIN — Medication 200 MG: at 08:36

## 2017-08-21 RX ADMIN — ACETAMINOPHEN 650 MG: 325 TABLET, FILM COATED ORAL at 08:37

## 2017-08-22 PROCEDURE — 74011250637 HC RX REV CODE- 250/637: Performed by: PHYSICAL MEDICINE & REHABILITATION

## 2017-08-22 RX ORDER — POTASSIUM CHLORIDE 750 MG/1
10 TABLET, FILM COATED, EXTENDED RELEASE ORAL 2 TIMES DAILY
Status: DISCONTINUED | OUTPATIENT
Start: 2017-08-22 | End: 2017-08-26 | Stop reason: HOSPADM

## 2017-08-22 RX ORDER — GABAPENTIN 300 MG/1
300 CAPSULE ORAL
Status: DISCONTINUED | OUTPATIENT
Start: 2017-08-22 | End: 2017-08-26 | Stop reason: HOSPADM

## 2017-08-22 RX ORDER — FUROSEMIDE 20 MG/1
20 TABLET ORAL
Status: DISCONTINUED | OUTPATIENT
Start: 2017-08-22 | End: 2017-08-26 | Stop reason: HOSPADM

## 2017-08-22 RX ORDER — GABAPENTIN 100 MG/1
100 CAPSULE ORAL 2 TIMES DAILY
Status: DISCONTINUED | OUTPATIENT
Start: 2017-08-22 | End: 2017-08-26 | Stop reason: HOSPADM

## 2017-08-22 RX ADMIN — FUROSEMIDE 20 MG: 20 TABLET ORAL at 16:22

## 2017-08-22 RX ADMIN — METOPROLOL TARTRATE 12.5 MG: 25 TABLET ORAL at 10:33

## 2017-08-22 RX ADMIN — WARFARIN SODIUM 3.5 MG: 2.5 TABLET ORAL at 18:00

## 2017-08-22 RX ADMIN — ACETAMINOPHEN 650 MG: 325 TABLET, FILM COATED ORAL at 20:18

## 2017-08-22 RX ADMIN — Medication 200 MG: at 10:30

## 2017-08-22 RX ADMIN — THERA TABS 1 TABLET: TAB at 10:30

## 2017-08-22 RX ADMIN — FUROSEMIDE 20 MG: 20 TABLET ORAL at 10:30

## 2017-08-22 RX ADMIN — POTASSIUM CHLORIDE 10 MEQ: 750 TABLET, FILM COATED, EXTENDED RELEASE ORAL at 16:22

## 2017-08-22 RX ADMIN — GABAPENTIN 100 MG: 100 CAPSULE ORAL at 16:22

## 2017-08-22 RX ADMIN — GABAPENTIN 300 MG: 300 CAPSULE ORAL at 20:18

## 2017-08-22 RX ADMIN — POTASSIUM CHLORIDE 10 MEQ: 750 TABLET, FILM COATED, EXTENDED RELEASE ORAL at 10:30

## 2017-08-22 RX ADMIN — ACETAMINOPHEN 650 MG: 325 TABLET, FILM COATED ORAL at 11:07

## 2017-08-22 RX ADMIN — Medication 1 CAPSULE: at 10:30

## 2017-08-22 RX ADMIN — MIRTAZAPINE 30 MG: 15 TABLET, FILM COATED ORAL at 20:18

## 2017-08-22 RX ADMIN — LISINOPRIL 5 MG: 5 TABLET ORAL at 10:34

## 2017-08-22 RX ADMIN — GABAPENTIN 100 MG: 100 CAPSULE ORAL at 11:07

## 2017-08-22 RX ADMIN — METOPROLOL TARTRATE 12.5 MG: 25 TABLET ORAL at 12:05

## 2017-08-22 RX ADMIN — GLIPIZIDE 2.5 MG: 5 TABLET ORAL at 10:32

## 2017-08-23 PROCEDURE — 74011250637 HC RX REV CODE- 250/637: Performed by: PHYSICAL MEDICINE & REHABILITATION

## 2017-08-23 RX ADMIN — ACETAMINOPHEN 650 MG: 325 TABLET, FILM COATED ORAL at 19:20

## 2017-08-23 RX ADMIN — Medication 1 CAPSULE: at 09:17

## 2017-08-23 RX ADMIN — FUROSEMIDE 20 MG: 20 TABLET ORAL at 17:10

## 2017-08-23 RX ADMIN — METOPROLOL TARTRATE 12.5 MG: 25 TABLET ORAL at 09:18

## 2017-08-23 RX ADMIN — LISINOPRIL 5 MG: 5 TABLET ORAL at 09:17

## 2017-08-23 RX ADMIN — POTASSIUM CHLORIDE 10 MEQ: 750 TABLET, FILM COATED, EXTENDED RELEASE ORAL at 17:13

## 2017-08-23 RX ADMIN — Medication 200 MG: at 09:17

## 2017-08-23 RX ADMIN — MIRTAZAPINE 30 MG: 15 TABLET, FILM COATED ORAL at 20:46

## 2017-08-23 RX ADMIN — ACETAMINOPHEN 650 MG: 325 TABLET, FILM COATED ORAL at 10:27

## 2017-08-23 RX ADMIN — THERA TABS 1 TABLET: TAB at 09:17

## 2017-08-23 RX ADMIN — POTASSIUM CHLORIDE 10 MEQ: 750 TABLET, FILM COATED, EXTENDED RELEASE ORAL at 09:17

## 2017-08-23 RX ADMIN — FUROSEMIDE 20 MG: 20 TABLET ORAL at 09:17

## 2017-08-23 RX ADMIN — WARFARIN SODIUM 3 MG: 2 TABLET ORAL at 17:13

## 2017-08-23 RX ADMIN — GLIPIZIDE 2.5 MG: 5 TABLET ORAL at 09:17

## 2017-08-23 RX ADMIN — GABAPENTIN 100 MG: 100 CAPSULE ORAL at 09:18

## 2017-08-23 RX ADMIN — GABAPENTIN 100 MG: 100 CAPSULE ORAL at 17:12

## 2017-08-23 RX ADMIN — GABAPENTIN 300 MG: 300 CAPSULE ORAL at 20:47

## 2017-08-24 PROCEDURE — 74011250637 HC RX REV CODE- 250/637: Performed by: PHYSICAL MEDICINE & REHABILITATION

## 2017-08-24 RX ADMIN — GLIPIZIDE 2.5 MG: 5 TABLET ORAL at 09:28

## 2017-08-24 RX ADMIN — FUROSEMIDE 20 MG: 20 TABLET ORAL at 09:27

## 2017-08-24 RX ADMIN — Medication 200 MG: at 09:27

## 2017-08-24 RX ADMIN — POTASSIUM CHLORIDE 10 MEQ: 750 TABLET, FILM COATED, EXTENDED RELEASE ORAL at 09:27

## 2017-08-24 RX ADMIN — METOPROLOL TARTRATE 12.5 MG: 25 TABLET ORAL at 09:28

## 2017-08-24 RX ADMIN — ACETAMINOPHEN 650 MG: 325 TABLET, FILM COATED ORAL at 09:27

## 2017-08-24 RX ADMIN — ACETAMINOPHEN 650 MG: 325 TABLET, FILM COATED ORAL at 21:40

## 2017-08-24 RX ADMIN — FUROSEMIDE 20 MG: 20 TABLET ORAL at 16:32

## 2017-08-24 RX ADMIN — LISINOPRIL 5 MG: 5 TABLET ORAL at 09:27

## 2017-08-24 RX ADMIN — GABAPENTIN 300 MG: 300 CAPSULE ORAL at 21:40

## 2017-08-24 RX ADMIN — GABAPENTIN 100 MG: 100 CAPSULE ORAL at 16:32

## 2017-08-24 RX ADMIN — THERA TABS 1 TABLET: TAB at 09:28

## 2017-08-24 RX ADMIN — WARFARIN SODIUM 3 MG: 2 TABLET ORAL at 17:56

## 2017-08-24 RX ADMIN — GABAPENTIN 100 MG: 100 CAPSULE ORAL at 09:28

## 2017-08-24 RX ADMIN — MIRTAZAPINE 30 MG: 15 TABLET, FILM COATED ORAL at 21:40

## 2017-08-24 RX ADMIN — Medication 1 CAPSULE: at 09:27

## 2017-08-24 RX ADMIN — POTASSIUM CHLORIDE 10 MEQ: 750 TABLET, FILM COATED, EXTENDED RELEASE ORAL at 16:32

## 2017-08-24 RX ADMIN — ACETAMINOPHEN 650 MG: 325 TABLET, FILM COATED ORAL at 16:31

## 2017-08-25 VITALS
DIASTOLIC BLOOD PRESSURE: 50 MMHG | SYSTOLIC BLOOD PRESSURE: 93 MMHG | BODY MASS INDEX: 28.35 KG/M2 | WEIGHT: 198 LBS | HEIGHT: 70 IN | HEART RATE: 93 BPM

## 2017-08-25 LAB
ANION GAP SERPL CALC-SCNC: 5 MMOL/L (ref 5–15)
BUN SERPL-MCNC: 21 MG/DL (ref 6–20)
BUN/CREAT SERPL: 23 (ref 12–20)
CALCIUM SERPL-MCNC: 8.6 MG/DL (ref 8.5–10.1)
CHLORIDE SERPL-SCNC: 103 MMOL/L (ref 97–108)
CO2 SERPL-SCNC: 30 MMOL/L (ref 21–32)
CREAT SERPL-MCNC: 0.9 MG/DL (ref 0.7–1.3)
ERYTHROCYTE [DISTWIDTH] IN BLOOD BY AUTOMATED COUNT: 13.3 % (ref 11.5–14.5)
GLUCOSE SERPL-MCNC: 137 MG/DL (ref 65–100)
HCT VFR BLD AUTO: 22.1 % (ref 36.6–50.3)
HGB BLD-MCNC: 7.1 G/DL (ref 12.1–17)
MCH RBC QN AUTO: 29.7 PG (ref 26–34)
MCHC RBC AUTO-ENTMCNC: 32.1 G/DL (ref 30–36.5)
MCV RBC AUTO: 92.5 FL (ref 80–99)
PLATELET # BLD AUTO: 325 K/UL (ref 150–400)
POTASSIUM SERPL-SCNC: 4.3 MMOL/L (ref 3.5–5.1)
RBC # BLD AUTO: 2.39 M/UL (ref 4.1–5.7)
SODIUM SERPL-SCNC: 138 MMOL/L (ref 136–145)
WBC # BLD AUTO: 6 K/UL (ref 4.1–11.1)

## 2017-08-25 PROCEDURE — 74011250637 HC RX REV CODE- 250/637: Performed by: PHYSICAL MEDICINE & REHABILITATION

## 2017-08-25 PROCEDURE — 80048 BASIC METABOLIC PNL TOTAL CA: CPT | Performed by: PHYSICAL MEDICINE & REHABILITATION

## 2017-08-25 PROCEDURE — 85027 COMPLETE CBC AUTOMATED: CPT | Performed by: PHYSICAL MEDICINE & REHABILITATION

## 2017-08-25 PROCEDURE — 36415 COLL VENOUS BLD VENIPUNCTURE: CPT | Performed by: PHYSICAL MEDICINE & REHABILITATION

## 2017-08-25 PROCEDURE — P9016 RBC LEUKOCYTES REDUCED: HCPCS | Performed by: PHYSICAL MEDICINE & REHABILITATION

## 2017-08-25 PROCEDURE — 86920 COMPATIBILITY TEST SPIN: CPT | Performed by: PHYSICAL MEDICINE & REHABILITATION

## 2017-08-25 PROCEDURE — 86900 BLOOD TYPING SEROLOGIC ABO: CPT | Performed by: PHYSICAL MEDICINE & REHABILITATION

## 2017-08-25 RX ORDER — PANTOPRAZOLE SODIUM 40 MG/1
40 TABLET, DELAYED RELEASE ORAL
Status: DISCONTINUED | OUTPATIENT
Start: 2017-08-25 | End: 2017-08-26 | Stop reason: HOSPADM

## 2017-08-25 RX ORDER — SODIUM CHLORIDE 0.9 % (FLUSH) 0.9 %
5 SYRINGE (ML) INJECTION EVERY 8 HOURS
Status: DISCONTINUED | OUTPATIENT
Start: 2017-08-25 | End: 2017-08-26 | Stop reason: HOSPADM

## 2017-08-25 RX ORDER — SODIUM CHLORIDE 0.9 % (FLUSH) 0.9 %
5 SYRINGE (ML) INJECTION AS NEEDED
Status: DISCONTINUED | OUTPATIENT
Start: 2017-08-25 | End: 2017-08-26 | Stop reason: HOSPADM

## 2017-08-25 RX ADMIN — THERA TABS 1 TABLET: TAB at 08:37

## 2017-08-25 RX ADMIN — GABAPENTIN 300 MG: 300 CAPSULE ORAL at 22:04

## 2017-08-25 RX ADMIN — METOPROLOL TARTRATE 12.5 MG: 25 TABLET ORAL at 22:04

## 2017-08-25 RX ADMIN — Medication 1 CAPSULE: at 08:38

## 2017-08-25 RX ADMIN — METOPROLOL TARTRATE 12.5 MG: 25 TABLET ORAL at 08:37

## 2017-08-25 RX ADMIN — Medication 5 ML: at 22:04

## 2017-08-25 RX ADMIN — FUROSEMIDE 20 MG: 20 TABLET ORAL at 17:22

## 2017-08-25 RX ADMIN — GLIPIZIDE 2.5 MG: 5 TABLET ORAL at 08:37

## 2017-08-25 RX ADMIN — GABAPENTIN 100 MG: 100 CAPSULE ORAL at 17:22

## 2017-08-25 RX ADMIN — ACETAMINOPHEN 650 MG: 325 TABLET, FILM COATED ORAL at 17:22

## 2017-08-25 RX ADMIN — POTASSIUM CHLORIDE 10 MEQ: 750 TABLET, FILM COATED, EXTENDED RELEASE ORAL at 17:22

## 2017-08-25 RX ADMIN — POTASSIUM CHLORIDE 10 MEQ: 750 TABLET, FILM COATED, EXTENDED RELEASE ORAL at 08:37

## 2017-08-25 RX ADMIN — WARFARIN SODIUM 3 MG: 2 TABLET ORAL at 17:22

## 2017-08-25 RX ADMIN — FUROSEMIDE 20 MG: 20 TABLET ORAL at 08:37

## 2017-08-25 RX ADMIN — MIRTAZAPINE 30 MG: 15 TABLET, FILM COATED ORAL at 22:04

## 2017-08-25 RX ADMIN — GABAPENTIN 100 MG: 100 CAPSULE ORAL at 08:37

## 2017-08-25 RX ADMIN — LISINOPRIL 5 MG: 5 TABLET ORAL at 08:37

## 2017-08-25 RX ADMIN — Medication 200 MG: at 08:37

## 2017-08-26 LAB
ABO + RH BLD: NORMAL
BLD PROD TYP BPU: NORMAL
BLOOD GROUP ANTIBODIES SERPL: NORMAL
BPU ID: NORMAL
CROSSMATCH RESULT,%XM: NORMAL
ERYTHROCYTE [DISTWIDTH] IN BLOOD BY AUTOMATED COUNT: 14 % (ref 11.5–14.5)
HCT VFR BLD AUTO: 27.8 % (ref 36.6–50.3)
HGB BLD-MCNC: 9 G/DL (ref 12.1–17)
MCH RBC QN AUTO: 29.6 PG (ref 26–34)
MCHC RBC AUTO-ENTMCNC: 32.4 G/DL (ref 30–36.5)
MCV RBC AUTO: 91.4 FL (ref 80–99)
PLATELET # BLD AUTO: 352 K/UL (ref 150–400)
RBC # BLD AUTO: 3.04 M/UL (ref 4.1–5.7)
SPECIMEN EXP DATE BLD: NORMAL
STATUS OF UNIT,%ST: NORMAL
UNIT DIVISION, %UDIV: 0
WBC # BLD AUTO: 6.5 K/UL (ref 4.1–11.1)

## 2017-08-26 PROCEDURE — 74011250637 HC RX REV CODE- 250/637: Performed by: PHYSICAL MEDICINE & REHABILITATION

## 2017-08-26 PROCEDURE — 36415 COLL VENOUS BLD VENIPUNCTURE: CPT | Performed by: PHYSICAL MEDICINE & REHABILITATION

## 2017-08-26 PROCEDURE — 85027 COMPLETE CBC AUTOMATED: CPT | Performed by: PHYSICAL MEDICINE & REHABILITATION

## 2017-08-26 PROCEDURE — 74011636637 HC RX REV CODE- 636/637: Performed by: PHYSICAL MEDICINE & REHABILITATION

## 2017-08-26 RX ADMIN — INSULIN LISPRO 2 UNITS: 100 INJECTION, SOLUTION INTRAVENOUS; SUBCUTANEOUS at 09:19

## 2017-08-26 RX ADMIN — POTASSIUM CHLORIDE 10 MEQ: 750 TABLET, FILM COATED, EXTENDED RELEASE ORAL at 09:18

## 2017-08-26 RX ADMIN — Medication 1 CAPSULE: at 09:18

## 2017-08-26 RX ADMIN — LISINOPRIL 5 MG: 5 TABLET ORAL at 09:18

## 2017-08-26 RX ADMIN — PANTOPRAZOLE SODIUM 40 MG: 40 TABLET, DELAYED RELEASE ORAL at 09:18

## 2017-08-26 RX ADMIN — METOPROLOL TARTRATE 12.5 MG: 25 TABLET ORAL at 09:19

## 2017-08-26 RX ADMIN — GABAPENTIN 100 MG: 100 CAPSULE ORAL at 09:18

## 2017-08-26 RX ADMIN — Medication 200 MG: at 09:18

## 2017-08-26 RX ADMIN — GLIPIZIDE 2.5 MG: 5 TABLET ORAL at 09:18

## 2017-08-26 RX ADMIN — THERA TABS 1 TABLET: TAB at 09:18

## 2017-08-26 RX ADMIN — FUROSEMIDE 20 MG: 20 TABLET ORAL at 09:18

## 2017-08-26 RX ADMIN — Medication 5 ML: at 06:31

## 2017-08-28 ENCOUNTER — TELEPHONE (OUTPATIENT)
Dept: MEDSURG UNIT | Age: 82
End: 2017-08-28

## 2017-08-28 NOTE — TELEPHONE ENCOUNTER
8/28 - patient is [place]? Stated his pain is well controlled with meds, no issues with constipation. Couldn't understand/hear when I was asking about incisional care.

## 2017-09-03 NOTE — DISCHARGE SUMMARY
[address]  [place] [address] SUMMARY    Patient Name:     Marycruz Ag      Site: [address]   MRN: [id number] Account #:    :   1935 Admit Date: 2017   Attending Physician: Dr. Nancie Claude Discharge Date: 2017   Referring Physician: Dr. 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 On postoperative day 1, the dressing was clean and dry, he was neurovascularly intact and afebrile, and his vital signs were stable. He participated in bedside physical therapy. He had expected postoperative anemia and hypotension. He received 2 units of PRBCs. On postoperative day 2, he was transferred to the Spine Unit. He made slow progress with physical therapy. On postoperative day 3, he participated with physical therapy and remained stable. REHABILITATION HOSPITAL COURSE: >    Patient was admitted to [facility] for intensive inpatient rehabilitation. He was noted to have significant wound drainage on admission he was also noted to have a urinary tract infection he had significant altered mental status due to these 2 issues. He was also noted to be hyponatremic.   He required gentle hydration we placed the deidre on his back we treated his bladder infection he had significant improvement in his mental status he then underwent a void trial.  His creatinine was monitored closely and improved after hydration patient has significant lower extremity edema due to Doppler studies were negative C. difficile was negative patient Roxicodone was discontinued and he had significant improved mentation due to all these issues. His INR was monitored as he was maintained on Coumadin daily dose he did require Neurontin for continued severe pain with fair improvement patient was anemic and required a transfusion on 8/26/2017  <FUNCTIONAL STATUS UPON ADMISSION: >   Max assist ADLs mobility  <FUNCTIONAL STATUS UPON DISCHARGE: >   Min assist ADLs mobility  <LAB SUMMARY: >    Results for Jose De Jesus Matias (MRN [id number]) as of 9/3/2017 13:42   Ref.  Range 8/25/2017 05:45 8/25/2017 12:58 8/26/2017 05:10   WBC Latest Ref Range: 4.1 - 11.1 K/uL 6.0  6.5   RBC Latest Ref Range: 4.10 - 5.70 M/uL 2.39 (L)  3.04 (L)   HGB Latest Ref Range: 12.1 - 17.0 g/dL 7.1 (L)  9.0 (L)   HCT Latest Ref Range: 36.6 - 50.3 % 22.1 (L)  27.8 (L)   MCV Latest Ref Range: 80.0 - 99.0 FL 92.5  91.4   MCH Latest Ref Range: 26.0 - 34.0 PG 29.7  29.6   MCHC Latest Ref Range: 30.0 - 36.5 g/dL 32.1  32.4   RDW Latest Ref Range: 11.5 - 14.5 % 13.3  14.0   PLATELET Latest Ref Range: 150 - 400 K/uL 325  352   Sodium Latest Ref Range: 136 - 145 mmol/L 138     Potassium Latest Ref Range: 3.5 - 5.1 mmol/L 4.3     Chloride Latest Ref Range: 97 - 108 mmol/L 103     CO2 Latest Ref Range: 21 - 32 mmol/L 30     Anion gap Latest Ref Range: 5 - 15 mmol/L 5     Glucose Latest Ref Range: 65 - 100 mg/dL 137 (H)     BUN Latest Ref Range: 6 - 20 MG/DL 21 (H)     Creatinine Latest Ref Range: 0.70 - 1.30 MG/DL 0.90     BUN/Creatinine ratio Latest Ref Range: 12 - 20   23 (H)     Calcium Latest Ref Range: 8.5 - 10.1 MG/DL 8.6     GFR est non-AA Latest Ref Range: >60 ml/min/1.73m2 >60     GFR est AA Latest Ref Range: >60 ml/min/1.73m2 >60       <DISCHARGE DISPOSITION: >      Home  <DISCHARGE home health physical therapy occupational therapy and nursing through [facility]        <DISCHARGE INSTRUCTIONS: >  INR Monday and Thursday to primary care physician       DISCHARGE MEDICATIONS: >   Oxycodone as needed  Glipizide 2.5 daily  Lisinopril 5 mg daily  Magnesium 250 daily  KCl 10 mEq daily for 2 weeks  Remeron 30 mg p.o. nightly  Coumadin 3 mg every evening. He was given 2 mg tablets and instructed to take   1-1/2 tablets    Lasix 20 mg daily for 2 weeks gabapentin 100 mg twice daily and 300 mg p.o. nightly metoprolol 12.5 twice daily    <DISCHARGE FOLLOW-UP>     PCP 8/29/2017  Dr. Sylvie Salas 9/8/2017     ____________  Gonzalez Pierre. Felicia Beach MD      DATE SIGNED          Greater than 30 min were spent in counseling, coordinating care, review of meds, physical examination and written note, in preparation and as part of discharge planning.

## 2019-11-17 NOTE — ANESTHESIA POSTPROCEDURE EVALUATION
Post-Anesthesia Evaluation and Assessment    Patient: Obey Leon MRN: [id number]  SSN: [id number]    YOB: 1935  Age: [de-identified] y.o. Sex: male       Cardiovascular Function/Vital Signs  Visit Vitals    /62    Pulse 73    Temp 35.8 °C (96.5 °F)    Resp 18    Ht 5' 10\" (1.778 m)    Wt 81.1 kg (178 lb 14.4 oz)    SpO2 100%    BMI 25.67 kg/m2       Patient is status post general anesthesia for Procedure(s):  T8-S1 THORACOLUMBAR  LAMINECTOMY WITH T6-L1 POSTERIOR SPINAL FUSION/ALLOGRAFT . Nausea/Vomiting: None    Postoperative hydration reviewed and adequate. Pain:  Pain Scale 1: Visual (08/01/17 1510)  Pain Intensity 1: 0 (08/01/17 1510)   Managed    Neurological Status:   Neuro (WDL): Exceptions to WDL (08/01/17 1510)  Neuro  Neurologic State: Drowsy (08/01/17 1510)  LUE Motor Response:  (unable to assess pt too drowsy) (08/01/17 1510)  LLE Motor Response: Numbness;Tingling (08/01/17 0021)  RUE Motor Response: Purposeful (08/01/17 0021)  RLE Motor Response: Numbness;Tingling (08/01/17 0021)   At baseline    Mental Status and Level of Consciousness: Arousable    Pulmonary Status:   O2 Device: Nasal cannula (08/01/17 1510)   Adequate oxygenation and airway patent    Complications related to anesthesia: None    Post-anesthesia assessment completed.  No concerns    Signed By: Ton Urrutia MD     August 1, 2017
Improved.

## 2023-01-19 NOTE — DISCHARGE SUMMARY
[facility]   [address] [facility] [address]    DISCHARGE SUMMARY     Patient: Carter Weston                             Medical Record Number: [id number]                : 1935  Age: [de-identified] y.o. Admit Date: 2017  Discharge Date: 2017  Admission Diagnosis: Intractable pain  THORACIC AND LUMBAR STENOSIS  Discharge Diagnosis: THORACIC AND LUMBAR STENOSIS  Procedures: Procedure(s):  T8-S1 THORACOLUMBAR  LAMINECTOMY WITH T6-L1 POSTERIOR SPINAL FUSION/ALLOGRAFT   Surgeon: Kanchan Wilson MD  Assistants: Kodak Rios PA-C  Anesthesia: General  Complications: None     History of Present Illness: This is an 31-year-old gentleman who is well known to our practice. I had done an anterior cervical fusion on him some time last year, C3-C4, which he did well without any particular repercussion. He did have some cord compression at that time. I felt that he would clearly improve neurologically. He presented just last week with acute onset of debilitating pain in his lower extremities. He was found to be quite weak in his lower extremities and on followup imaging demonstrated to have severe canal stenosis in the thoracic spine at 3 different levels as well as diffuse severe arthritic changes in the lumbar   spine, producing varying degrees of spinal stenosis from L1-S1. Given the extent of these complaints of discomfort, but as well the degree of weakness, a thoracolumbar decompression and fusion offered. Potential benefits and complications reviewed. He opted to proceed with the surgery. Hospital Course: Mr. Evleia Presley was admitted from the emergency department on 17 for intractable pain. He was evaluated and it was determined he was a surgical candidate. He remained hospitalized for observation and pain control. He underwent a thoracolumbar laminectomy and fusion on 17. Carter Weston tolerated the procedure well.   He was transferred to the Neuro Unit from the recovery room in stable condition. On postoperative day 1, the dressing was clean and dry, he was neurovascularly intact and afebrile, and his vital signs were stable. He participated in bedside physical therapy. He had expected postoperative anemia and hypotension. He received 2 units of PRBCs. On postoperative day 2, he was transferred to the Spine Unit. He made slow progress with physical therapy. On postoperative day 3, he participated with physical therapy and remained stable. Hemoglobin prior to discharge were   Lab Results   Component Value Date/Time    HGB 9.2 08/08/2017 01:22 PM     Carter Weston made slow progress with physical therapy and was discharged to [address] [place] [address] in stable condition on postoperative day 3. He was given routine postoperative instructions and advised to follow up in my office in 2 weeks following discharge home. He was given a prescription for pain medication. Discharge Medications: This patient is currently admitted, however, discharge medications can only be displayed within the current admission encounter.     Signed by: Brian Gutierrez PA-C  8/8/2017 BMP/CBC/PT/PTT/INR/Type and Cross/Type and Screen/COVID-19

## (undated) DEVICE — SPONGE LAP 18X18IN STRL -- 5/PK

## (undated) DEVICE — COVER,TABLE,HEAVY DUTY,60"X90",STRL: Brand: MEDLINE

## (undated) DEVICE — FLOSEAL HEMOSTATIC MATRIX, 10 ML: Brand: FLOSEAL

## (undated) DEVICE — INFECTION CONTROL KIT SYS

## (undated) DEVICE — KENDALL SCD EXPRESS SLEEVES, KNEE LENGTH, MEDIUM: Brand: KENDALL SCD

## (undated) DEVICE — X-RAY SPONGES,16 PLY: Brand: DERMACEA

## (undated) DEVICE — PREP SKN PREVAIL 40ML APPL --

## (undated) DEVICE — HANDPIECE SET WITH BONE CLEANING TIP AND SUCTION TUBE: Brand: INTERPULSE

## (undated) DEVICE — BLADE SAW W12XL345MM THK051MM CUT THK056MM REPL S STL SAG

## (undated) DEVICE — TRAY CATH 16F URIN MTR LTX -- CONVERT TO ITEM 363111

## (undated) DEVICE — PILLOW POS AD L7IN R FOAM HD REST INTUB SLOT DISP

## (undated) DEVICE — SUTURE ABSRB BRAID COAT UD OS-6 NO 1 27IN VCRL J535H

## (undated) DEVICE — TOOL 14BA50 LEGEND 14CM 5MM BA: Brand: MIDAS REX ™

## (undated) DEVICE — BONE WAX WHITE: Brand: BONE WAX WHITE

## (undated) DEVICE — SUTURE VCRL 2-0 L27IN ABSRB UD CP-2 L26MM 1/2 CIR REV CUT J869H

## (undated) DEVICE — SUTURE VCRL SZ 3-0 L18IN ABSRB UD PS-2 L19MM 1/2 CIR J497G

## (undated) DEVICE — SOLUTION IV 250ML 0.9% SOD CHL CLR INJ FLX BG CONT PRT CLSR

## (undated) DEVICE — CODMAN® INTEGRATED BIPOLAR CORD AND TUBING SET FLYING LEADS, ROTARY PUMP: Brand: CODMAN®

## (undated) DEVICE — GAUZE SPONGES,12 PLY: Brand: CURITY

## (undated) DEVICE — PACKING 8004003 NEURAY 200PK 25X25MM: Brand: NEURAY ®

## (undated) DEVICE — GOWN,SIRUS,NONRNF,SETINSLV,2XL,18/CS: Brand: MEDLINE

## (undated) DEVICE — 4-PORT MANIFOLD: Brand: NEPTUNE 2

## (undated) DEVICE — SUTURE VCRL SZ 1 L36IN ABSRB UD L36MM CT-1 1/2 CIR J947H

## (undated) DEVICE — SUTURE VCRL SZ 1 L27IN ABSRB UD L36MM CP-1 1/2 CIR REV CUT J268H

## (undated) DEVICE — SUTURE VCRL SZ 3-0 L27IN ABSRB UD CP-2 L26MM 1/2 CIR REV J868H

## (undated) DEVICE — STERILE POLYISOPRENE POWDER-FREE SURGICAL GLOVES WITH EMOLLIENT COATING: Brand: PROTEXIS

## (undated) DEVICE — SOLUTION IRRIG 3000ML 0.9% SOD CHL FLX CONT 0797208] ICU MEDICAL INC]

## (undated) DEVICE — DRAIN KT WND 10FR RND 400ML --

## (undated) DEVICE — LAMINECTOMY RICHMOND-LF: Brand: MEDLINE INDUSTRIES, INC.